# Patient Record
Sex: FEMALE | Race: WHITE | Employment: FULL TIME | URBAN - METROPOLITAN AREA
[De-identification: names, ages, dates, MRNs, and addresses within clinical notes are randomized per-mention and may not be internally consistent; named-entity substitution may affect disease eponyms.]

---

## 2019-01-03 ENCOUNTER — OFFICE VISIT (OUTPATIENT)
Dept: ENDOCRINOLOGY | Age: 49
End: 2019-01-03
Payer: COMMERCIAL

## 2019-01-03 VITALS
BODY MASS INDEX: 42.99 KG/M2 | SYSTOLIC BLOOD PRESSURE: 130 MMHG | DIASTOLIC BLOOD PRESSURE: 76 MMHG | HEIGHT: 62 IN | WEIGHT: 233.6 LBS | HEART RATE: 70 BPM | OXYGEN SATURATION: 99 % | RESPIRATION RATE: 16 BRPM

## 2019-01-03 DIAGNOSIS — E78.5 HYPERLIPIDEMIA, UNSPECIFIED HYPERLIPIDEMIA TYPE: ICD-10-CM

## 2019-01-03 DIAGNOSIS — E55.9 VITAMIN D DEFICIENCY: ICD-10-CM

## 2019-01-03 DIAGNOSIS — E10.9 TYPE 1 DIABETES MELLITUS WITHOUT COMPLICATION (HCC): Primary | ICD-10-CM

## 2019-01-03 PROCEDURE — G8427 DOCREV CUR MEDS BY ELIG CLIN: HCPCS | Performed by: INTERNAL MEDICINE

## 2019-01-03 PROCEDURE — 99213 OFFICE O/P EST LOW 20 MIN: CPT | Performed by: INTERNAL MEDICINE

## 2019-01-03 PROCEDURE — 1036F TOBACCO NON-USER: CPT | Performed by: INTERNAL MEDICINE

## 2019-01-03 PROCEDURE — 3046F HEMOGLOBIN A1C LEVEL >9.0%: CPT | Performed by: INTERNAL MEDICINE

## 2019-01-03 PROCEDURE — G8417 CALC BMI ABV UP PARAM F/U: HCPCS | Performed by: INTERNAL MEDICINE

## 2019-01-03 PROCEDURE — 2022F DILAT RTA XM EVC RTNOPTHY: CPT | Performed by: INTERNAL MEDICINE

## 2019-01-03 PROCEDURE — G8484 FLU IMMUNIZE NO ADMIN: HCPCS | Performed by: INTERNAL MEDICINE

## 2019-01-03 RX ORDER — SIMVASTATIN 20 MG
20 TABLET ORAL NIGHTLY
Qty: 90 TABLET | Refills: 1
Start: 2019-01-03

## 2019-01-03 RX ORDER — LISINOPRIL 5 MG/1
5 TABLET ORAL DAILY
COMMUNITY

## 2019-01-03 RX ORDER — MECLIZINE HYDROCHLORIDE 25 MG/1
25 TABLET ORAL PRN
COMMUNITY
Start: 2017-12-13

## 2019-01-03 RX ORDER — NAPROXEN 500 MG/1
500 TABLET ORAL PRN
COMMUNITY
Start: 2016-10-17

## 2019-04-03 ENCOUNTER — OFFICE VISIT (OUTPATIENT)
Dept: ENDOCRINOLOGY | Age: 49
End: 2019-04-03
Payer: COMMERCIAL

## 2019-04-03 VITALS
HEIGHT: 62 IN | SYSTOLIC BLOOD PRESSURE: 138 MMHG | OXYGEN SATURATION: 99 % | HEART RATE: 79 BPM | DIASTOLIC BLOOD PRESSURE: 80 MMHG | WEIGHT: 227.2 LBS | BODY MASS INDEX: 41.81 KG/M2

## 2019-04-03 DIAGNOSIS — E55.9 VITAMIN D DEFICIENCY: ICD-10-CM

## 2019-04-03 DIAGNOSIS — E10.9 TYPE 1 DIABETES MELLITUS WITHOUT COMPLICATION (HCC): Primary | ICD-10-CM

## 2019-04-03 LAB — HBA1C MFR BLD: 6.5 %

## 2019-04-03 PROCEDURE — 1036F TOBACCO NON-USER: CPT | Performed by: INTERNAL MEDICINE

## 2019-04-03 PROCEDURE — 3044F HG A1C LEVEL LT 7.0%: CPT | Performed by: INTERNAL MEDICINE

## 2019-04-03 PROCEDURE — 3017F COLORECTAL CA SCREEN DOC REV: CPT | Performed by: INTERNAL MEDICINE

## 2019-04-03 PROCEDURE — 83036 HEMOGLOBIN GLYCOSYLATED A1C: CPT | Performed by: INTERNAL MEDICINE

## 2019-04-03 PROCEDURE — G8427 DOCREV CUR MEDS BY ELIG CLIN: HCPCS | Performed by: INTERNAL MEDICINE

## 2019-04-03 PROCEDURE — G8417 CALC BMI ABV UP PARAM F/U: HCPCS | Performed by: INTERNAL MEDICINE

## 2019-04-03 PROCEDURE — 99214 OFFICE O/P EST MOD 30 MIN: CPT | Performed by: INTERNAL MEDICINE

## 2019-04-03 PROCEDURE — 2022F DILAT RTA XM EVC RTNOPTHY: CPT | Performed by: INTERNAL MEDICINE

## 2019-04-03 NOTE — PROGRESS NOTES
ENDOCRINOLOGY CLINIC NOTE    Date of Service: 04/3/2019     Medical Records Reviewed:   I personally reviewed and summarized previous records     Primary Care Physician: Deandre Swanson DO  Provider: Rose Wells MD  Other provider(s):            Reason for the visit:  Type I DM on insulin Pump, vitD deficiency, Hyperlipidemia     History of Present Illness: The history is provided by the patient. No  was used. Accuracy of the patient data is excellent. Alessandro Chacon is a very pleasant 47 y.o. female seen in Endocrine clinic today for diabetes management     The history is provided by the patient. No  was used. Accuracy of the patient data is excellent. Alessandro Chacon is a very pleasant 51year old female seen in Endocrine clinic today for diabetes management     Alessandro Chacon was diagnosed with diabetes in 2001   Pump: medtronic pump 670 , has been on and off automode and sensors. Pt troubled with skin rash from CGM adhesive material   Current settings:   Basal Insulin:12a 0.850, 5a 0.850, 12p 0.875, 5p 0.9, 10p 0.850 Carbohydrate ratio 12, , Sensitivity: 12a 55, 7a 55, 9p 55, Target: 115 -120 Active Insulin: 4 hours  Continues to have a problem with tape from sensors, has tried a number of suggestions without benefit  Insulin: Novolog  Range of BSs: 61 to 200  Lab Results   Component Value Date    LABA1C 6.5 04/03/2019     No recent significant hypoglycemic episodes   Activity level: Busy with Family and work  Lifestyle modification: Diet    Patient is up to date with eye exam and denied any h/o diabetic retinopathy   Podiatry Exam:self care     Microvascular complications: no nephropathy, retinopathy, neuropathy  Macrovascular complications: no CAD, PVD, no CVA    2.  Hyperlipidemia - on statin     PAST MEDICAL HISTORY   Past Medical History:   Diagnosis Date    Depression     DM type 1 [facility]diabetes mellitus[facility] type 1) ([facility])     On insulin Pump     Hyperlipidemia     Insulin pump in place     Vitamin D deficiency      PAST SURGICAL HISTORY   No past surgical history on file. SOCIAL HISTORY   Social History     Socioeconomic History    Marital status:      Spouse name: Not on file    Number of children: Not on file    Years of education: Not on file    Highest education level: Not on file   Occupational History    Not on file   Social Needs    Financial resource strain: Not on file    Food insecurity:     Worry: Not on file     Inability: Not on file    Transportation needs:     Medical: Not on file     Non-medical: Not on file   Tobacco Use    Smoking status: Never Smoker    Smokeless tobacco: Never Used   Substance and Sexual Activity    Alcohol use: Yes     Comment: socially    Drug use: No    Sexual activity: Not on file   Lifestyle    Physical activity:     Days per week: Not on file     Minutes per session: Not on file    Stress: Not on file   Relationships    Social connections:     Talks on phone: Not on file     Gets together: Not on file     Attends [employer] service: Not on file     Active member of club or organization: Not on file     Attends meetings of clubs or organizations: Not on file     Relationship status: Not on file    Intimate partner violence:     Fear of current or ex partner: Not on file     Emotionally abused: Not on file     Physically abused: Not on file     Forced sexual activity: Not on file   Other Topics Concern    Not on file   Social History Narrative    Not on file     FAMILY HISTORY   Family History   Problem Relation Age of Onset    COPD Father     Heart Disease Father     Diabetes Paternal Grandmother     Heart Disease Paternal Grandfather      ALLERGIES AND DRUG REACTIONS   Allergies   Allergen Reactions    Tramadol      Other reaction(s):  Other See Comments  Syncope       CURRENT MEDICATIONS     Current Outpatient Medications   Medication Sig Dispense Refill    lisinopril (PRINIVIL;ZESTRIL) 5 MG tablet Take 5 mg by mouth daily      meclizine (ANTIVERT) 25 MG tablet Take 25 mg by mouth as needed       naproxen (NAPROSYN) 500 MG tablet Take 500 mg by mouth as needed       sertraline (ZOLOFT) 50 MG tablet Take 50 mg by mouth daily      insulin lispro (HUMALOG) 100 UNIT/ML injection vial Via insulin Pump.  units daily 3 vial 5    simvastatin (ZOCOR) 20 MG tablet Take 1 tablet by mouth nightly 90 tablet 1     No current facility-administered medications for this visit. Review of Systems  Constitutional: No fever, no chills, no diaphoresis, no generalized weakness. HEENT: No blurred vision, No sore throat, no ear pain, no hair loss  Neck: denied any neck swelling, difficulty swallowing,   Cardio-pulmonary: No CP, SOB or palpitation, No orthopnea or PND. No cough or wheezing. GI: No N/V/D, no constipation, No abdominal pain, no melena or hematochezia   : Denied any dysuria, hematuria, flank pain, discharge, or incontinence. Skin: denied any rash, ulcer, Hirsute, or hyperpigmentation. MSK: denied any joint deformity, joint pain/swelling, muscle pain, or back pain. Neuro: no numbness, no tingling, no weakness, _  OBJECTIVE    /80 (Site: Right Upper Arm, Position: Sitting, Cuff Size: Medium Adult)   Pulse 79   Ht 5' 2\" (1.575 m)   Wt 227 lb 3.2 oz (103.1 kg)   SpO2 99%   BMI 41.56 kg/m²   BP Readings from Last 4 Encounters:   04/03/19 138/80   01/03/19 130/76     Wt Readings from Last 6 Encounters:   04/03/19 227 lb 3.2 oz (103.1 kg)   01/03/19 233 lb 9.6 oz (106 kg)       Physical examination:  General: awake alert, oriented x3, no abnormal position or movements. HEENT: normocephalic non-traumatic, no exophthalmos   Neck: supple, no LN enlargement, no thyromegaly, no thyroid tenderness, no JVD. Pulm: Clear equal air entry no added sounds, no wheezing or rhonchi    CVS: S1 + S2, no murmur, no heave.  Dorsalis pedis pulse palpable   Abd: soft lax, no tenderness, no organomegaly, audible bowel sounds. Skin: warm, no lesions, no rash. No callus, no Ulcers, No acanthosis nigricans. Insulin pump insertion site looks normal    Neuro: CN intact, Monofilament sensation arian bilateral , muscle power normal  Psych: normal mood, and affect      Review of Laboratory Data:  I have reviewed the followin [address] [facility])  Component Name Value Ref Range   Sodium(Na) 141     Potassium(K) 3.7     Glucose 105     Urea Nitrogen 5     Creatinine 0.72     Calcium(Ca) 9.2     EGFR NON  >60      HEMOGLOBIN A1C2018  [address])  Component Name Value Ref Range   HEMOGLOBIN A1C 6.2 (A)      VITAMIN D 25-OH SCREEN FOR DEFICIENCY/[id number]/9/2018  [facility])  Component Name Value Ref Range   Vitamin D, 25-OH,D2 32.2      URINE ALBUMIN/[id number]/9/2018  [facility])  Component Name Value Ref Range   Urine Albumin 4.0     Albumin-Creat.  Ratio 7.2     Urine Creatinine 55.3          No results found for: WBC, RBC, HGB, HCT, MCV, MCH, MCHC, RDW, PLT, MPV, GRANULOCYTES, BANDS   No results found for: NA, K, CO2, BUN, CALCIUM, GFR   No results found for: TSH, T4FREE, W4ZVKGX, FT3, B7KLVNF, TSI, TPOABS, THGAB  Lab Results   Component Value Date    LABA1C 6.5 2019     No results found for: CHOL, TRIG, HDL, LDLDIRECT  No results found for: VITD25     Medical Records/Labs/Images review:   I personally reviewed and summarized previous records   All labs were reviewed independently     Almita Collier, a 47 y.o.-old female seen in for the following issues     Diabetes Mellitus Type 1   · Continue current pump settings: Basal Insulin:12a 0.850, 5a 0.850, 12p 0.875, 5p 0.9, 10p 0.850 Carbohydrate ratio 12, , Sensitivity: 12a 55, 7a 55, 9p 55, Target: 115 -120 Active Insulin: 4 hours  · Optimal blood sugars: 100-140 pre-prandial, < 180 post-prandial. BG above 200 increases risk of dehydration, glycosuria, and DKA. · Patient up to date with the routine diabetes maintenance and prevention   · Labs before next OV     Hyperlipidemia  · At goal, continue statin    Low vitamin D  · Continue taking vitD 2000 IU daily    Return in about 3 months (around 7/3/2019) for DM type on insulin Pump . The above issues were reviewed with the patient who understood and agreed with the plan. Thank you for allowing us to participate in the care of this patient. Please do not hesitate to contact us with any additional questions. Diagnosis Orders   1. Type 1 diabetes mellitus without complication (HCC)  POCT glycosylated hemoglobin (Hb A1C)    Basic Metabolic Panel    Hemoglobin A1C    Microalbumin / Creatinine Urine Ratio   2.  Vitamin D deficiency  Vitamin D 25 Hydrox, D2 & D3       Miguel Cam MD  Endocrinologist, [facility])   [address] [place] [id number]   Phone: [phone]  Fax: [phone]  --------------------------------------------  Electronically signed

## 2019-04-03 NOTE — LETTER
Basal Insulin:12a 0.850, 5a 0.850, 12p 0.875, 5p 0.9, 10p 0.850 Carbohydrate ratio 12, , Sensitivity: 12a 55, 7a 55, 9p 55, Target: 115 -120 Active Insulin: 4 hours  Continues to have a problem with tape from sensors, has tried a number of suggestions without benefit  Insulin: Novolog  Range of BSs: 61 to 200  Lab Results   Component Value Date    LABA1C 6.5 04/03/2019     No recent significant hypoglycemic episodes   Activity level: Busy with Family and work  Lifestyle modification: Diet    Patient is up to date with eye exam and denied any h/o diabetic retinopathy   Podiatry Exam:self care     Microvascular complications: no nephropathy, retinopathy, neuropathy  Macrovascular complications: no CAD, PVD, no CVA    2. Hyperlipidemia - on statin     PAST MEDICAL HISTORY   Past Medical History:   Diagnosis Date    Depression     DM type 1 (diabetes mellitus, type 1) ([facility])     On insulin Pump     Hyperlipidemia     Insulin pump in place     Vitamin D deficiency      PAST SURGICAL HISTORY   No past surgical history on file.   SOCIAL HISTORY   Social History     Socioeconomic History    Marital status:      Spouse name: Not on file    Number of children: Not on file    Years of education: Not on file    Highest education level: Not on file   Occupational History    Not on file   Social Needs    Financial resource strain: Not on file    Food insecurity:     Worry: Not on file     Inability: Not on file    Transportation needs:     Medical: Not on file     Non-medical: Not on file   Tobacco Use    Smoking status: Never Smoker    Smokeless tobacco: Never Used   Substance and Sexual Activity    Alcohol use: Yes     Comment: socially    Drug use: No    Sexual activity: Not on file   Lifestyle    Physical activity:     Days per week: Not on file     Minutes per session: Not on file    Stress: Not on file   Relationships    Social connections:     Talks on phone: Not on file Gets together: Not on file     Attends [employer] service: Not on file     Active member of club or organization: Not on file     Attends meetings of clubs or organizations: Not on file     Relationship status: Not on file    Intimate partner violence:     Fear of current or ex partner: Not on file     Emotionally abused: Not on file     Physically abused: Not on file     Forced sexual activity: Not on file   Other Topics Concern    Not on file   Social History Narrative    Not on file     FAMILY HISTORY   Family History   Problem Relation Age of Onset    COPD Father     Heart Disease Father     Diabetes Paternal Grandmother     Heart Disease Paternal Grandfather      ALLERGIES AND DRUG REACTIONS   Allergies   Allergen Reactions    Tramadol      Other reaction(s): Other See Comments  Syncope       CURRENT MEDICATIONS     Current Outpatient Medications   Medication Sig Dispense Refill    lisinopril (PRINIVIL;ZESTRIL) 5 MG tablet Take 5 mg by mouth daily      meclizine (ANTIVERT) 25 MG tablet Take 25 mg by mouth as needed       naproxen (NAPROSYN) 500 MG tablet Take 500 mg by mouth as needed       sertraline (ZOLOFT) 50 MG tablet Take 50 mg by mouth daily      insulin lispro (HUMALOG) 100 UNIT/ML injection vial Via insulin Pump.  units daily 3 vial 5    simvastatin (ZOCOR) 20 MG tablet Take 1 tablet by mouth nightly 90 tablet 1     No current facility-administered medications for this visit. Review of Systems  Constitutional: No fever, no chills, no diaphoresis, no generalized weakness. HEENT: No blurred vision, No sore throat, no ear pain, no hair loss  Neck: denied any neck swelling, difficulty swallowing,   Cardio-pulmonary: No CP, SOB or palpitation, No orthopnea or PND. No cough or wheezing. GI: No N/V/D, no constipation, No abdominal pain, no melena or hematochezia   : Denied any dysuria, hematuria, flank pain, discharge, or incontinence. [facility])  Component Name Value Ref Range   Urine Albumin 4.0     Albumin-Creat. Ratio 7.2     Urine Creatinine 55.3          No results found for: WBC, RBC, HGB, HCT, MCV, MCH, MCHC, RDW, PLT, MPV, GRANULOCYTES, BANDS   No results found for: NA, K, CO2, BUN, CALCIUM, GFR   No results found for: TSH, T4FREE, T8DLUTX, FT3, Z1APVAN, TSI, TPOABS, THGAB  Lab Results   Component Value Date    LABA1C 6.5 04/03/2019     No results found for: CHOL, TRIG, HDL, LDLDIRECT  No results found for: VITD25     Medical Records/Labs/Images review:   I personally reviewed and summarized previous records   All labs were reviewed independently     Almita Collier, a 47 y.o.-old female seen in for the following issues     Diabetes Mellitus Type 1   · Continue current pump settings: Basal Insulin:12a 0.850, 5a 0.850, 12p 0.875, 5p 0.9, 10p 0.850 Carbohydrate ratio 12, , Sensitivity: 12a 55, 7a 55, 9p 55, Target: 115 -120 Active Insulin: 4 hours  · Optimal blood sugars: 100-140 pre-prandial, < 180 post-prandial. BG above 200 increases risk of dehydration, glycosuria, and DKA. · Patient up to date with the routine diabetes maintenance and prevention   · Labs before next OV     Hyperlipidemia  · At goal, continue statin    Low vitamin D  · Continue taking vitD 2000 IU daily    Return in about 3 months (around 7/3/2019) for DM type on insulin Pump . The above issues were reviewed with the patient who understood and agreed with the plan. Thank you for allowing us to participate in the care of this patient. Please do not hesitate to contact us with any additional questions. Diagnosis Orders   1. Type 1 diabetes mellitus without complication (HCC)  POCT glycosylated hemoglobin (Hb A1C)    Basic Metabolic Panel    Hemoglobin A1C    Microalbumin / Creatinine Urine Ratio   2.  Vitamin D deficiency  Vitamin D 25 Hydrox, D2 & D3       Noe Michaels MD Endocrinologist, [facility])   [address] [facility] [address]   Phone: [phone]  Fax: [phone]  --------------------------------------------  Electronically signed

## 2019-07-10 ENCOUNTER — OFFICE VISIT (OUTPATIENT)
Dept: ENDOCRINOLOGY | Age: 49
End: 2019-07-10
Payer: COMMERCIAL

## 2019-07-10 VITALS
HEIGHT: 62 IN | HEART RATE: 62 BPM | WEIGHT: 223.8 LBS | BODY MASS INDEX: 41.18 KG/M2 | OXYGEN SATURATION: 98 % | SYSTOLIC BLOOD PRESSURE: 128 MMHG | DIASTOLIC BLOOD PRESSURE: 82 MMHG | RESPIRATION RATE: 16 BRPM

## 2019-07-10 DIAGNOSIS — E55.9 VITAMIN D DEFICIENCY: ICD-10-CM

## 2019-07-10 DIAGNOSIS — E78.5 HYPERLIPIDEMIA, UNSPECIFIED HYPERLIPIDEMIA TYPE: ICD-10-CM

## 2019-07-10 DIAGNOSIS — E10.9 TYPE 1 DIABETES MELLITUS WITHOUT COMPLICATION (HCC): Primary | ICD-10-CM

## 2019-07-10 PROCEDURE — G8417 CALC BMI ABV UP PARAM F/U: HCPCS | Performed by: INTERNAL MEDICINE

## 2019-07-10 PROCEDURE — G8427 DOCREV CUR MEDS BY ELIG CLIN: HCPCS | Performed by: INTERNAL MEDICINE

## 2019-07-10 PROCEDURE — 1036F TOBACCO NON-USER: CPT | Performed by: INTERNAL MEDICINE

## 2019-07-10 PROCEDURE — 99214 OFFICE O/P EST MOD 30 MIN: CPT | Performed by: INTERNAL MEDICINE

## 2019-07-10 PROCEDURE — 3044F HG A1C LEVEL LT 7.0%: CPT | Performed by: INTERNAL MEDICINE

## 2019-07-10 PROCEDURE — 3017F COLORECTAL CA SCREEN DOC REV: CPT | Performed by: INTERNAL MEDICINE

## 2019-07-10 PROCEDURE — 2022F DILAT RTA XM EVC RTNOPTHY: CPT | Performed by: INTERNAL MEDICINE

## 2019-07-10 RX ORDER — FLASH GLUCOSE SCANNING READER
EACH MISCELLANEOUS
Qty: 1 DEVICE | Refills: 0 | Status: SHIPPED | OUTPATIENT
Start: 2019-07-10 | End: 2019-07-10

## 2019-07-10 RX ORDER — FLASH GLUCOSE SCANNING READER
EACH MISCELLANEOUS
Qty: 1 DEVICE | Refills: 0 | Status: SHIPPED | OUTPATIENT
Start: 2019-07-10 | End: 2020-05-15

## 2019-07-10 RX ORDER — FLASH GLUCOSE SENSOR
KIT MISCELLANEOUS
Qty: 2 EACH | Refills: 5 | Status: SHIPPED | OUTPATIENT
Start: 2019-07-10 | End: 2020-05-15

## 2019-07-10 RX ORDER — FLASH GLUCOSE SENSOR
KIT MISCELLANEOUS
Qty: 2 EACH | Refills: 5 | Status: SHIPPED | OUTPATIENT
Start: 2019-07-10 | End: 2019-07-10

## 2019-07-10 NOTE — PROGRESS NOTES
ENDOCRINOLOGY CLINIC NOTE    Date of Service: 04/3/2019     Primary Care Physician: Osmar Roy DO  Provider: Jabier Reyes MD  Other provider(s):            Reason for the visit:  Type I DM on insulin Pump, vitD deficiency, Hyperlipidemia     History of Present Illness: The history is provided by the patient. No  was used. Accuracy of the patient data is excellent. Suze Vieyra is a very pleasant 47 y.o. female seen in Endocrine clinic today for diabetes management     Suze Vieyra was diagnosed with diabetes in 2001 and has been on insulin Pump for many years   Currently on medtronic pump 670 , has been on and off automode and sensors. Pt troubled with skin rash from CGM adhesive material   Current settings:  Basal Insulin:12a 0.820, 5a 0.850, 12p 0.875, 5p 0.9, 10p 0.850, CR 12, ISF 12a 55, 7a 55, 9p 55, Target: 100 -120 Active Insulin: 4 hours  Continues to have skin reaction with tape from sensors, has tried a number of suggestions without benefit  6/2019 - A1c 6.1%   The patient still experiencing frequent hypoglycemic episodes both fasting and postprandial (see pump download)   Activity level: Busy with Family and work  Lifestyle modification: Diet  Patient is up to date with eye exam and denied any h/o diabetic retinopathy   Podiatry Exam:self care   Microvascular complications: no nephropathy, retinopathy, or neuropathy  Macrovascular complications: no CAD, PVD, or CVA    2. Hyperlipidemia - on statin. PAST MEDICAL HISTORY   Past Medical History:   Diagnosis Date    Depression     DM type 1 (diabetes mellitus, type 1) (HCC)     On insulin Pump     Hyperlipidemia     Insulin pump in place     Vitamin D deficiency      PAST SURGICAL HISTORY   No past surgical history on file.   SOCIAL HISTORY   Social History     Socioeconomic History    Marital status:      Spouse name: Not on file    Number of children: Not on file    Years of education: Not on file    Highest -120 Active Insulin: 4 hours  · Unfortunately she wasn't able to use CGM with her pump due to skin allergy to adhesive   · Will order freestyle Niya sensor and scanner   · Optimal blood sugars: 100-140 pre-prandial, < 180 post-prandial. BG above 200 increases risk of dehydration, glycosuria, and DKA. · Patient up to date with the routine diabetes maintenance and prevention     Hyperlipidemia  · At goal, continue statin    Low vitamin D  · Continue vitD supplement     Return in about 3 months (around 10/10/2019) for DM type 1  . The above issues were reviewed with the patient who understood and agreed with the plan. Thank you for allowing us to participate in the care of this patient. Please do not hesitate to contact us with any additional questions. Diagnosis Orders   1.  Type 1 diabetes mellitus without complication (HCC)  Continuous Blood Gluc Sensor (FREESTYLE NIYA 14 DAY SENSOR) MISC    Continuous Blood Gluc  (FREESTYLE NIYA 14 DAY READER) TIKI    DISCONTINUED: Continuous Blood Gluc Sensor (FREESTYLE NIYA 14 DAY SENSOR) MISC    DISCONTINUED: Continuous Blood Gluc  (FREESTYLE NIYA 14 DAY READER) Lay Bates MD  Endocrinologist, [facility])   [address] [facility], [address] [place] [address],[facility] [address]   Phone: [phone]  Fax: [phone]  --------------------------------------------  Electronically signed

## 2019-10-10 DIAGNOSIS — E10.9 TYPE 1 DIABETES MELLITUS WITHOUT COMPLICATION (HCC): ICD-10-CM

## 2019-10-10 DIAGNOSIS — E10.9 TYPE 1 DIABETES MELLITUS WITHOUT COMPLICATION (HCC): Primary | ICD-10-CM

## 2019-10-16 ENCOUNTER — OFFICE VISIT (OUTPATIENT)
Dept: ENDOCRINOLOGY | Age: 49
End: 2019-10-16
Payer: COMMERCIAL

## 2019-10-16 VITALS
WEIGHT: 227.6 LBS | SYSTOLIC BLOOD PRESSURE: 132 MMHG | HEIGHT: 62 IN | OXYGEN SATURATION: 99 % | RESPIRATION RATE: 16 BRPM | BODY MASS INDEX: 41.88 KG/M2 | DIASTOLIC BLOOD PRESSURE: 78 MMHG | HEART RATE: 86 BPM

## 2019-10-16 DIAGNOSIS — E78.5 HYPERLIPIDEMIA, UNSPECIFIED HYPERLIPIDEMIA TYPE: ICD-10-CM

## 2019-10-16 DIAGNOSIS — E16.2 HYPOGLYCEMIA: ICD-10-CM

## 2019-10-16 DIAGNOSIS — E10.9 TYPE 1 DIABETES MELLITUS WITHOUT COMPLICATION (HCC): Primary | ICD-10-CM

## 2019-10-16 DIAGNOSIS — E55.9 VITAMIN D DEFICIENCY: ICD-10-CM

## 2019-10-16 LAB — HBA1C MFR BLD: 6.8 %

## 2019-10-16 PROCEDURE — 2022F DILAT RTA XM EVC RTNOPTHY: CPT | Performed by: INTERNAL MEDICINE

## 2019-10-16 PROCEDURE — 3044F HG A1C LEVEL LT 7.0%: CPT | Performed by: INTERNAL MEDICINE

## 2019-10-16 PROCEDURE — G8484 FLU IMMUNIZE NO ADMIN: HCPCS | Performed by: INTERNAL MEDICINE

## 2019-10-16 PROCEDURE — 1036F TOBACCO NON-USER: CPT | Performed by: INTERNAL MEDICINE

## 2019-10-16 PROCEDURE — 3017F COLORECTAL CA SCREEN DOC REV: CPT | Performed by: INTERNAL MEDICINE

## 2019-10-16 PROCEDURE — G8427 DOCREV CUR MEDS BY ELIG CLIN: HCPCS | Performed by: INTERNAL MEDICINE

## 2019-10-16 PROCEDURE — G8417 CALC BMI ABV UP PARAM F/U: HCPCS | Performed by: INTERNAL MEDICINE

## 2019-10-16 PROCEDURE — 83036 HEMOGLOBIN GLYCOSYLATED A1C: CPT | Performed by: INTERNAL MEDICINE

## 2019-10-16 PROCEDURE — 99214 OFFICE O/P EST MOD 30 MIN: CPT | Performed by: INTERNAL MEDICINE

## 2020-01-10 ENCOUNTER — TELEPHONE (OUTPATIENT)
Dept: ADMINISTRATIVE | Age: 50
End: 2020-01-10

## 2020-03-11 RX ORDER — PERPHENAZINE 16 MG/1
TABLET, FILM COATED ORAL
Qty: 200 EACH | Refills: 5 | Status: SHIPPED | OUTPATIENT
Start: 2020-03-11

## 2020-05-04 LAB
AVERAGE GLUCOSE: NORMAL
BUN BLDV-MCNC: NORMAL MG/DL
CALCIUM SERPL-MCNC: 8.8 MG/DL
CHLORIDE BLD-SCNC: NORMAL MMOL/L
CO2: NORMAL
CREAT SERPL-MCNC: 0.69 MG/DL
GFR CALCULATED: 101
GLUCOSE BLD-MCNC: NORMAL MG/DL
HBA1C MFR BLD: 6.9 %
POTASSIUM SERPL-SCNC: 4.2 MMOL/L
SODIUM BLD-SCNC: 138 MMOL/L

## 2020-05-06 RX ORDER — BLOOD SUGAR DIAGNOSTIC
STRIP MISCELLANEOUS
Qty: 200 EACH | Refills: 5 | Status: SHIPPED | OUTPATIENT
Start: 2020-05-06

## 2020-05-06 RX ORDER — BLOOD-GLUCOSE METER
EACH MISCELLANEOUS
Qty: 1 KIT | Refills: 1 | Status: SHIPPED | OUTPATIENT
Start: 2020-05-06

## 2020-05-15 ENCOUNTER — VIRTUAL VISIT (OUTPATIENT)
Dept: ENDOCRINOLOGY | Age: 50
End: 2020-05-15
Payer: COMMERCIAL

## 2020-05-15 PROCEDURE — 99214 OFFICE O/P EST MOD 30 MIN: CPT | Performed by: INTERNAL MEDICINE

## 2020-05-15 NOTE — PROGRESS NOTES
Darleen Ordonez was read the following message We want to confirm that, for purposes of billing, this is a virtual visit with your provider for which we will submit a claim for reimbursement with your insurance company. You will be responsible for any copays, coinsurance amounts or other amounts not covered by your insurance company. If you do not accept this, unfortunately we will not be able to schedule a virtual visit with the provider. Do you accept?  Kolton Ramos responded YES
: Denied any dysuria, hematuria, flank pain, discharge, or incontinence. Skin: denied any rash, ulcer, Hirsute, or hyperpigmentation. MSK: denied any joint deformity, joint pain/swelling, muscle pain, or back pain. Neuro: no numbness, no tingling, no weakness,  OBJECTIVE    There were no vitals taken for this visit. BP Readings from Last 4 Encounters:   10/16/19 132/78   07/10/19 128/82   04/03/19 138/80   01/03/19 130/76     Wt Readings from Last 6 Encounters:   10/16/19 227 lb 9.6 oz (103.2 kg)   07/10/19 223 lb 12.8 oz (101.5 kg)   04/03/19 227 lb 3.2 oz (103.1 kg)   01/03/19 233 lb 9.6 oz (106 kg)     Physical examination:  Due to this being a TeleHealth encounter, evaluation of the following organ systems is limited: Vitals/Constitutional/EENT/Resp/CV/GI//MS/Neuro/Skin/Heme-Lymph-Imm. Modified physical exam through Telemedicine camera    General: Communicating well via camera   Neck: no obvious neck mass. No obvious neck deformity     CVS: no distress   Chest: no distress. Chest is moving with respiration    Extremities:  no visible tremor  Skin: No visible rashes as seen from camera   Musculoskeletal: no visible deformity  Neuro: Alert and oriented to person, place, and time. Psychiatric: Normal mood and affect.  Behavior is normal    Review of Laboratory Data:  I have reviewed the following:VITAMIN D 25-OH 6/8/2019  Component Name Value   Vitamin D, 25-OH,D2 45.2     No results found for: WBC, RBC, HGB, HCT, MCV, MCH, MCHC, RDW, PLT, MPV, GRANULOCYTES, BANDS   Lab Results   Component Value Date/Time     05/04/2020    K 4.2 05/04/2020    CALCIUM 8.8 05/04/2020      No results found for: TSH, T4FREE, E5VKJIV, FT3, T1GOPHJ, TSI, TPOABS, THGAB  Lab Results   Component Value Date    LABA1C 6.9 05/04/2020     No results found for: CHOL, TRIG, HDL, LDLDIRECT  No results found for: VITD25     Medical Records/Labs/Images review:   I personally reviewed and summarized previous records   All labs

## 2020-08-27 ENCOUNTER — TELEPHONE (OUTPATIENT)
Dept: ENDOCRINOLOGY | Age: 50
End: 2020-08-27

## 2020-08-27 NOTE — TELEPHONE ENCOUNTER
Pt called in and requested a new start to try the dexcom cgm, educated pt will pend rx for dexcom transmitter and sensor for pt to try if  approves.

## 2020-08-28 RX ORDER — BLOOD-GLUCOSE SENSOR
EACH MISCELLANEOUS
Qty: 9 EACH | Refills: 3 | Status: SHIPPED | OUTPATIENT
Start: 2020-08-28 | End: 2020-09-25 | Stop reason: SDUPTHER

## 2020-08-28 RX ORDER — BLOOD-GLUCOSE TRANSMITTER
EACH MISCELLANEOUS
Qty: 1 EACH | Refills: 0 | Status: SHIPPED | OUTPATIENT
Start: 2020-08-28 | End: 2020-09-25 | Stop reason: SDUPTHER

## 2020-09-01 RX ORDER — BLOOD-GLUCOSE SENSOR
EACH MISCELLANEOUS
Qty: 3 EACH | Refills: 11 | Status: SHIPPED | OUTPATIENT
Start: 2020-09-01

## 2020-09-01 RX ORDER — BLOOD-GLUCOSE,RECEIVER,CONT
EACH MISCELLANEOUS
Qty: 1 DEVICE | Refills: 0 | Status: SHIPPED
Start: 2020-09-01 | End: 2020-09-25 | Stop reason: SDUPTHER

## 2020-09-01 RX ORDER — BLOOD-GLUCOSE TRANSMITTER
EACH MISCELLANEOUS
Qty: 1 EACH | Refills: 3 | Status: SHIPPED | OUTPATIENT
Start: 2020-09-01

## 2020-09-25 LAB — DIABETIC RETINOPATHY: NORMAL

## 2020-09-25 RX ORDER — BLOOD-GLUCOSE TRANSMITTER
EACH MISCELLANEOUS
Qty: 1 EACH | Refills: 0 | Status: SHIPPED | OUTPATIENT
Start: 2020-09-25

## 2020-09-25 RX ORDER — BLOOD-GLUCOSE,RECEIVER,CONT
EACH MISCELLANEOUS
Qty: 1 DEVICE | Refills: 0 | Status: SHIPPED | OUTPATIENT
Start: 2020-09-25

## 2020-09-25 RX ORDER — BLOOD-GLUCOSE SENSOR
EACH MISCELLANEOUS
Qty: 9 EACH | Refills: 3 | Status: SHIPPED | OUTPATIENT
Start: 2020-09-25

## 2020-10-22 ENCOUNTER — TELEPHONE (OUTPATIENT)
Dept: ENDOCRINOLOGY | Age: 50
End: 2020-10-22

## 2020-11-10 ENCOUNTER — TELEPHONE (OUTPATIENT)
Dept: ENDOCRINOLOGY | Age: 50
End: 2020-11-10

## 2020-11-10 NOTE — TELEPHONE ENCOUNTER
Pt called and is having surgery on 11/21/2020 Pt wanted to know what to do about her blood sugar. She stated that it uses drops during surgery. The place where she is having it done at wanted some kind of clearance from you.

## 2020-11-16 ENCOUNTER — OFFICE VISIT (OUTPATIENT)
Dept: ENDOCRINOLOGY | Age: 50
End: 2020-11-16
Payer: COMMERCIAL

## 2020-11-16 VITALS
WEIGHT: 235.8 LBS | BODY MASS INDEX: 43.13 KG/M2 | HEART RATE: 71 BPM | DIASTOLIC BLOOD PRESSURE: 78 MMHG | TEMPERATURE: 98.3 F | SYSTOLIC BLOOD PRESSURE: 128 MMHG

## 2020-11-16 PROCEDURE — 99214 OFFICE O/P EST MOD 30 MIN: CPT | Performed by: INTERNAL MEDICINE

## 2020-11-16 NOTE — PROGRESS NOTES
[address] [place] [address] Department of Endocrinology Diabetes and Metabolism   [address] [facility] [address]   Phone: [phone]  Fax: [phone]    Date of Service: 11/16/20    Primary Care Physician: Nikki Jeffries DO  Provider: Gilmer Browning MD     Reason for the visit:  Type I DM on insulin Pump, vitD deficiency, Hyperlipidemia     History of Present Illness: The history is provided by the patient. No  was used. Accuracy of the patient data is excellent. Goldie Georges is a very pleasant 45 y.o. female seen today for follow up visit     Goldie Georges was diagnosed with diabetes in 2001 and has been on insulin Pump for many years   Currently on medtronic pump 670 , has been on and off automode and sensors. Pt troubled with skin rash from CGM adhesive material   Current settings:  Basal Insulin:12a 0.750, 5a 0.725, 12p 0.825, 5p 0.850, 10p 0.80, CR 13 , ISF 12a 85 , 7a 85 , 9p 85 , Target: 115 -120 Active Insulin: 3;30 hours  Continues to have skin reaction with tape from sensors, has tried a number of suggestions without benefit  Lab Results   Component Value Date    LABA1C 6.9 05/04/2020    LABA1C 6.8 10/16/2019    LABA1C 6.5 04/03/2019     The patient still experiencing frequent hypoglycemic episodes both fasting and postprandial (see pump download)   Activity level: Busy with Family and work  Lifestyle modification: Diet  Patient is up to date with eye exam and denied any h/o diabetic retinopathy   Podiatry Exam:self care   Microvascular complications: no nephropathy, retinopathy, or neuropathy  Macrovascular complications: no CAD, PVD, or CVA    2. Hyperlipidemia - on statin.      PAST MEDICAL HISTORY   Past Medical History:   Diagnosis Date    Depression     DM type 1 (diabetes mellitus, type 1) ([facility])     On insulin Pump     Hyperlipidemia     Insulin pump in place     Vitamin D deficiency      PAST SURGICAL HISTORY   No past surgical history on file.  SOCIAL HISTORY   Tobacco:   reports that she has never smoked. She has never used smokeless tobacco.  Alcol:   reports current alcohol use. Illicit Drugs:   reports no history of drug use. FAMILY HISTORY   Family History   Problem Relation Age of Onset    COPD Father     Heart Disease Father     Diabetes Paternal Grandmother     Heart Disease Paternal Grandfather      ALLERGIES AND DRUG REACTIONS   Allergies   Allergen Reactions    Tramadol      Other reaction(s): Other See Comments  Syncope       CURRENT MEDICATIONS     Current Outpatient Medications   Medication Sig Dispense Refill    Continuous Blood Gluc  (DEXCOM G6 ) TIKI To use with G6 sensor 1 Device 0    Continuous Blood Gluc Sensor (DEXCOM G6 SENSOR) MISC Change every 10 days 9 each 3    Continuous Blood Gluc Transmit (DEXCOM G6 TRANSMITTER) MISC To use with sensors 1 each 0    Continuous Blood Gluc Transmit (DEXCOM G6 TRANSMITTER) MISC To change every 3 months 1 each 3    Continuous Blood Gluc Sensor (DEXCOM G6 SENSOR) MISC To change every 10 days 3 each 11    insulin aspart (NOVOLOG) 100 UNIT/ML injection vial USE VIA INSULIN PUMP. MAX  UNITS PER DAY 30 mL 3    Blood Glucose Monitoring Suppl (ONETOUCH VERIO FLEX SYSTEM) w/Device KIT Use to test blood sugar as directed 1 kit 1    blood glucose test strips (ONETOUCH VERIO) strip Use to test blood sugar 4 times a day 200 each 5    blood glucose test strips (ONE TOUCH ULTRA TEST) strip 1 each by In Vitro route 4 times daily As needed.  150 each 5    CONTOUR NEXT TEST strip Use to test blood sugar 4-6 times a day with medtronic insulin pump 200 each 5    lisinopril (PRINIVIL;ZESTRIL) 5 MG tablet Take 5 mg by mouth daily      meclizine (ANTIVERT) 25 MG tablet Take 25 mg by mouth as needed       naproxen (NAPROSYN) 500 MG tablet Take 500 mg by mouth as needed       sertraline (ZOLOFT) 50 MG tablet Take 50 mg by mouth daily      simvastatin (ZOCOR) 20 MG tablet Ulcers, No acanthosis nigricans. Insulin pump insertion site looks normal    Neuro: CN intact, Monofilament sensation arian bilateral , muscle power normal  Psych: normal mood, and affect    Review of Laboratory Data:  I have reviewed the following:VITAMIN D 25-OH 6/8/2019  Component Name Value   Vitamin D, 25-OH,D2 45.2     No results found for: WBC, RBC, HGB, HCT, MCV, MCH, MCHC, RDW, PLT, MPV, GRANULOCYTES, BANDS   Lab Results   Component Value Date/Time     05/04/2020    K 4.2 05/04/2020    CALCIUM 8.8 05/04/2020      No results found for: TSH, T4FREE, W9RFXVH, FT3, R8JUTCP, TSI, TPOABS, THGAB  Lab Results   Component Value Date    LABA1C 6.9 05/04/2020     No results found for: CHOL, TRIG, HDL, LDLDIRECT  No results found for: VITD25     Medical Records/Labs/Images review:   I personally reviewed and summarized previous records   All labs were reviewed independently     Almita 54, a 45 y.o.-old female seen in for the following issues     Diabetes Mellitus Type 1   · A1c 6.8% with less hypoglycemic episodes   · Will change pump settings to: Basal Insulin 12a 0.625, 5a 0.625, 12p 0.750, 5p 0.750, 10p 0.70, CR 13 , ISF 12a 85 , 7a 85 , 9p 85 , Target: 115 -120 Active Insulin: 3;30 hours  · Using g6 DEXCOM CGM  · Patient up to date with the routine diabetes maintenance and prevention   Pt is scheduled for hysterectomy in few days. We recommended change pump settings right before surgery and the day of surgery to --> Basal Insulin 12a 0.475, 5a 0.5, 12p 0.550, 5p 0.550, 10p 0.4750, CR 13 , ISF 12a 85 , 7a 85 , 9p 85 , Target: 115 -120 Active Insulin: 3;30 hours (pt was provide with instructions and she knows how to change the settings)   From endocrine standpoint, the patient is medically stable for upcoming surgery as planned.  Please let me know if you need any further information     Hyperlipidemia  · At goal, continue Zocor 20 mg daily     Low vitamin D  · Continue vitD supplement     Return in about 4 months (around 3/16/2021) for DM type 1 on insulin . The above issues were reviewed with the patient who understood and agreed with the plan  Thank you for allowing us to participate in the care of this patient. Please do not hesitate to contact us with any additional questions. Diagnosis Orders   1. Type 1 diabetes mellitus without complication (HCC)  insulin aspart (NOVOLOG) 100 UNIT/ML injection vial   2. Insulin pump in place     3. Hyperlipidemia, unspecified hyperlipidemia type     4. Vitamin D deficiency     5. Hypoglycemia         Delmar Alvarez MD  Endocrinologist, [facility])   [address] [place] [address], [address] [place] [address],[address] [id number]   Phone: [phone]  Fax: [phone]  --------------------------------------------  An electronic signature was used to authenticate this note.  Bryant Fields MD on 11/16/2020 at 1:45 PM

## 2021-03-16 ENCOUNTER — OFFICE VISIT (OUTPATIENT)
Dept: ENDOCRINOLOGY | Age: 51
End: 2021-03-16
Payer: COMMERCIAL

## 2021-03-16 VITALS
HEART RATE: 70 BPM | TEMPERATURE: 97.5 F | DIASTOLIC BLOOD PRESSURE: 76 MMHG | BODY MASS INDEX: 42.69 KG/M2 | WEIGHT: 232 LBS | SYSTOLIC BLOOD PRESSURE: 124 MMHG | OXYGEN SATURATION: 97 % | HEIGHT: 62 IN

## 2021-03-16 DIAGNOSIS — E55.9 VITAMIN D DEFICIENCY: Primary | ICD-10-CM

## 2021-03-16 DIAGNOSIS — E10.9 TYPE 1 DIABETES MELLITUS WITHOUT COMPLICATION (HCC): ICD-10-CM

## 2021-03-16 DIAGNOSIS — Z96.41 INSULIN PUMP IN PLACE: ICD-10-CM

## 2021-03-16 DIAGNOSIS — E16.2 HYPOGLYCEMIA: ICD-10-CM

## 2021-03-16 DIAGNOSIS — E78.5 HYPERLIPIDEMIA, UNSPECIFIED HYPERLIPIDEMIA TYPE: ICD-10-CM

## 2021-03-16 LAB — HBA1C MFR BLD: 6.7 %

## 2021-03-16 PROCEDURE — 83036 HEMOGLOBIN GLYCOSYLATED A1C: CPT | Performed by: INTERNAL MEDICINE

## 2021-03-16 PROCEDURE — 99214 OFFICE O/P EST MOD 30 MIN: CPT | Performed by: INTERNAL MEDICINE

## 2021-03-16 NOTE — PROGRESS NOTES
facility-administered medications for this visit. Review of Systems  Constitutional: No fever, no chills, no diaphoresis, no generalized weakness. HEENT: No blurred vision, No sore throat, no ear pain, no hair loss  Neck: denied any neck swelling, difficulty swallowing,   Cardio-pulmonary: No CP, SOB or palpitation, No orthopnea or PND. No cough or wheezing. GI: No N/V/D, no constipation, No abdominal pain, no melena or hematochezia   : Denied any dysuria, hematuria, flank pain, discharge, or incontinence. Skin: denied any rash, ulcer, Hirsute, or hyperpigmentation. MSK: denied any joint deformity, joint pain/swelling, muscle pain, or back pain. Neuro: no numbness, no tingling, no weakness,  OBJECTIVE    /76   Pulse 70   Temp 97.5 °F (36.4 °C) (Temporal)   Ht 5' 2\" (1.575 m)   Wt 232 lb (105.2 kg)   SpO2 97%   BMI 42.43 kg/m²   BP Readings from Last 4 Encounters:   03/16/21 124/76   11/16/20 128/78   10/16/19 132/78   07/10/19 128/82     Wt Readings from Last 6 Encounters:   03/16/21 232 lb (105.2 kg)   11/16/20 235 lb 12.8 oz (107 kg)   10/16/19 227 lb 9.6 oz (103.2 kg)   07/10/19 223 lb 12.8 oz (101.5 kg)   04/03/19 227 lb 3.2 oz (103.1 kg)   01/03/19 233 lb 9.6 oz (106 kg)     Physical examination:  General: awake alert, oriented x3, no abnormal position or movements. HEENT: normocephalic non-traumatic, no exophthalmos   Neck: supple, no LN enlargement, no thyromegaly, no thyroid tenderness, no JVD. Pulm: Clear equal air entry no added sounds, no wheezing or rhonchi    CVS: S1 + S2, no murmur, no heave. Dorsalis pedis pulse palpable   Abd: soft lax, no tenderness, no organomegaly, audible bowel sounds. Skin: warm, no lesions, no rash. No callus, no Ulcers, No acanthosis nigricans.  Insulin pump insertion site looks normal    Neuro: CN intact, Monofilament sensation arian bilateral , muscle power normal  Psych: normal mood, and affect    Review of Laboratory Data:  I have reviewed the following:VITAMIN D 25-OH 6/8/2019  Component Name Value   Vitamin D, 25-OH,D2 45.2     No results found for: WBC, RBC, HGB, HCT, MCV, MCH, MCHC, RDW, PLT, MPV, GRANULOCYTES, BANDS   Lab Results   Component Value Date/Time     05/04/2020    K 4.2 05/04/2020    CALCIUM 8.8 05/04/2020      No results found for: TSH, T4FREE, P2ABPBV, FT3, I3NVCEB, TSI, TPOABS, THGAB  Lab Results   Component Value Date    LABA1C 6.7 03/16/2021     No results found for: CHOL, TRIG, HDL, LDLDIRECT  No results found for: [address] [facility] Ryanne Campos, a 45 y.o.-old female seen in for the following issues     Diabetes Mellitus Type 1   · A1c 6.8% with less hypoglycemic episodes   · Will change pump settings to: 12a 0.625, 5:30a 0.625, 12p 0.725, 10p 0.70, CR 12a 13, 12p 12, 10p 13, , ISF 85, Target: 110, Active Insulin: 3;30 hours  · Using g6 DEXCOM CGM  · Patient up to date with the routine diabetes maintenance and prevention     Hypoglycemia  · Improved comparing with last visit   · Continue using Pump and CGM     Hyperlipidemia  · Continue Zocor 20 mg daily     Low vitamin D  · Continue vitD supplement    I personally reviewed external notes from PCP and other patient's care team providers, and personally interpreted labs associated with the above diagnosis. I also ordered labs to further assess and manage the above addressed medical conditions    Return in about 4 months (around 7/16/2021) for DM type 1, VitD deficiency. The above issues were reviewed with the patient who understood and agreed with the plan  Thank you for allowing us to participate in the care of this patient. Please do not hesitate to contact us with any additional questions. Diagnosis Orders   1. Vitamin D deficiency  Vitamin D 25 Hydroxy   2.  Type 1 diabetes mellitus without complication (HCC)  insulin aspart (NOVOLOG) 100 UNIT/ML injection vial    POCT glycosylated hemoglobin (Hb A1C)    Basic Metabolic Panel    Hemoglobin A1C    Lipid Panel    Microalbumin / Creatinine Urine Ratio   3. Hypoglycemia     4. Hyperlipidemia, unspecified hyperlipidemia type     5. Insulin pump in place       Klarissa Baig MD  Endocrinologist, [address], [address] [place] [address],[facility] [address] [id number]   Phone: [phone]  Fax: [phone]  --------------------------------------------  An electronic signature was used to authenticate this note.  Eliot Whitten MD on 3/16/2021 at 1:34 PM

## 2021-07-17 LAB
AVERAGE GLUCOSE: NORMAL
BUN BLDV-MCNC: NORMAL MG/DL
CALCIUM SERPL-MCNC: 8.7 MG/DL
CHLORIDE BLD-SCNC: 101 MMOL/L
CHOLESTEROL, TOTAL: 168 MG/DL
CHOLESTEROL/HDL RATIO: 2.2
CO2: 28 MMOL/L
CREAT SERPL-MCNC: 0.73 MG/DL
GFR CALCULATED: 95
GLUCOSE BLD-MCNC: 174 MG/DL
HBA1C MFR BLD: 6.4 %
HDLC SERPL-MCNC: 76 MG/DL (ref 35–70)
LDL CHOLESTEROL CALCULATED: 80 MG/DL (ref 0–160)
NONHDLC SERPL-MCNC: ABNORMAL MG/DL
POTASSIUM SERPL-SCNC: 4.1 MMOL/L
SODIUM BLD-SCNC: 135 MMOL/L
TRIGL SERPL-MCNC: 59 MG/DL
VITAMIN D 25-HYDROXY: 38
VITAMIN D2, 25 HYDROXY: NORMAL
VITAMIN D3,25 HYDROXY: NORMAL
VLDLC SERPL CALC-MCNC: ABNORMAL MG/DL

## 2021-07-20 DIAGNOSIS — E10.9 TYPE 1 DIABETES MELLITUS WITHOUT COMPLICATION (HCC): ICD-10-CM

## 2021-07-20 DIAGNOSIS — E55.9 VITAMIN D DEFICIENCY: ICD-10-CM

## 2021-07-28 ENCOUNTER — OFFICE VISIT (OUTPATIENT)
Dept: ENDOCRINOLOGY | Age: 51
End: 2021-07-28
Payer: COMMERCIAL

## 2021-07-28 VITALS
WEIGHT: 243.8 LBS | BODY MASS INDEX: 44.87 KG/M2 | HEIGHT: 62 IN | DIASTOLIC BLOOD PRESSURE: 81 MMHG | SYSTOLIC BLOOD PRESSURE: 128 MMHG | HEART RATE: 69 BPM

## 2021-07-28 DIAGNOSIS — E10.9 TYPE 1 DIABETES MELLITUS WITHOUT COMPLICATION (HCC): Primary | ICD-10-CM

## 2021-07-28 DIAGNOSIS — Z96.41 INSULIN PUMP IN PLACE: ICD-10-CM

## 2021-07-28 DIAGNOSIS — E10.649 TYPE 1 DIABETES MELLITUS WITH HYPOGLYCEMIA AND WITHOUT COMA (HCC): ICD-10-CM

## 2021-07-28 DIAGNOSIS — E78.5 HYPERLIPIDEMIA, UNSPECIFIED HYPERLIPIDEMIA TYPE: ICD-10-CM

## 2021-07-28 DIAGNOSIS — E55.9 VITAMIN D DEFICIENCY: ICD-10-CM

## 2021-07-28 DIAGNOSIS — E66.01 CLASS 3 SEVERE OBESITY DUE TO EXCESS CALORIES WITHOUT SERIOUS COMORBIDITY WITH BODY MASS INDEX (BMI) OF 40.0 TO 44.9 IN ADULT (HCC): ICD-10-CM

## 2021-07-28 PROCEDURE — 99214 OFFICE O/P EST MOD 30 MIN: CPT | Performed by: CLINICAL NURSE SPECIALIST

## 2021-07-28 PROCEDURE — 95251 CONT GLUC MNTR ANALYSIS I&R: CPT | Performed by: CLINICAL NURSE SPECIALIST

## 2021-07-28 NOTE — PROGRESS NOTES
[address] [place] [address] Department of Endocrinology Diabetes and Metabolism   [address] [facility] [address]   Phone: [phone]  Fax: [phone]    Date of Service: 7/28/2021    Primary Care Physician: No primary care provider on file. Referring physician: No ref. provider found  Provider: Michelle Bledsoe     Reason for the visit:  Type I DM on insulin Pump, vitD deficiency, Hyperlipidemia      History of Present Illness: The history is provided by the patient. No  was used. Accuracy of the patient data is excellent. Sera Mayes is a very pleasant 45 y.o. female seen today for follow up visit      Sera Mayes was diagnosed with diabetes in 2001 and has been on insulin Pump for many years   Currently on Tandem with Dexcom   Current settings:  Basal Insulin:12a 0.625, 5:30a 0.675, 12p 0.75, 10p 0.70, CR 0000 1:13, 530 am 1:13, 12 pm 1:12, 10pm 1:13,  , ISF 85, Target: 110, Active Insulin: 3;30 hours  CGM skin reaction much  improved since switching to dexcom   Average 137  CGM TIR 71%  < 1 % hypoglycemia   Lab Results   Component Value Date    LABA1C 6.4 07/17/2021    LABA1C 6.7 03/16/2021    LABA1C 6.9 05/04/2020   hypoglycemia infrequent   Activity level: denies exercise   Lifestyle modification: Diet 3 meals , eats sweets   Patient is up to date with eye exam 8/2020 and denied any h/o diabetic retinopathy   Podiatry Exam:self care   Microvascular complications: no nephropathy, retinopathy, or neuropathy  Macrovascular complications: no CAD, PVD, or CVA  On statin     PAST MEDICAL HISTORY   Past Medical History:   Diagnosis Date    Depression     DM type 1 (diabetes mellitus, type 1) (HCC)     On insulin Pump     Hyperlipidemia     Insulin pump in place     Vitamin D deficiency        PAST SURGICAL HISTORY   No past surgical history on file. SOCIAL HISTORY   Tobacco:   reports that she has never smoked.  She has never used smokeless tobacco.  Alcohol: reports current alcohol use. Drugs:   reports no history of drug use. FAMILY HISTORY   Family History   Problem Relation Age of Onset    COPD Father     Heart Disease Father     Diabetes Paternal Grandmother     Heart Disease Paternal Grandfather        ALLERGIES AND DRUG REACTIONS   Allergies   Allergen Reactions    Tramadol      Other reaction(s): Other See Comments  Syncope       CURRENT MEDICATIONS   Current Outpatient Medications   Medication Sig Dispense Refill    insulin aspart (NOVOLOG) 100 UNIT/ML injection vial USE VIA INSULIN PUMP. MAX  UNITS PER DAY 3 vial 11    Continuous Blood Gluc  (DEXCOM G6 ) TIKI To use with G6 sensor 1 Device 0    Continuous Blood Gluc Sensor (DEXCOM G6 SENSOR) MISC Change every 10 days 9 each 3    Continuous Blood Gluc Transmit (DEXCOM G6 TRANSMITTER) MISC To use with sensors 1 each 0    Continuous Blood Gluc Transmit (DEXCOM G6 TRANSMITTER) MISC To change every 3 months 1 each 3    Continuous Blood Gluc Sensor (DEXCOM G6 SENSOR) MISC To change every 10 days 3 each 11    Blood Glucose Monitoring Suppl (ONETOUCH VERIO FLEX SYSTEM) w/Device KIT Use to test blood sugar as directed 1 kit 1    blood glucose test strips (ONETOUCH VERIO) strip Use to test blood sugar 4 times a day 200 each 5    blood glucose test strips (ONE TOUCH ULTRA TEST) strip 1 each by In Vitro route 4 times daily As needed. 150 each 5    CONTOUR NEXT TEST strip Use to test blood sugar 4-6 times a day with medtronic insulin pump 200 each 5    lisinopril (PRINIVIL;ZESTRIL) 5 MG tablet Take 5 mg by mouth daily      meclizine (ANTIVERT) 25 MG tablet Take 25 mg by mouth as needed       naproxen (NAPROSYN) 500 MG tablet Take 500 mg by mouth as needed       sertraline (ZOLOFT) 50 MG tablet Take 50 mg by mouth daily      simvastatin (ZOCOR) 20 MG tablet Take 1 tablet by mouth nightly 90 tablet 1     No current facility-administered medications for this visit.        Review of Systems  Constitutional: No fever, no chills, no diaphoresis, no generalized weakness. HEENT: No blurred vision, No sore throat, no ear pain, no hair loss  Neck: denied any neck swelling, difficulty swallowing,   Cardio-pulmonary: No CP, SOB or palpitation, No orthopnea or PND. No cough or wheezing. GI: No N/V/D, no constipation, No abdominal pain, no melena or hematochezia   : Denied any dysuria, hematuria, flank pain, discharge, or incontinence. Skin: denied any rash, ulcer, Hirsute, or hyperpigmentation. MSK: denied any joint deformity, joint pain/swelling, muscle pain, or back pain. Neuro: no numbness, no tingling, no weakness, _    OBJECTIVE    /81   Pulse 69   Ht 5' 2\" (1.575 m)   Wt 243 lb 12.8 oz (110.6 kg)   BMI 44.59 kg/m²   BP Readings from Last 4 Encounters:   07/28/21 128/81   03/16/21 124/76   11/16/20 128/78   10/16/19 132/78     Wt Readings from Last 6 Encounters:   07/28/21 243 lb 12.8 oz (110.6 kg)   03/16/21 232 lb (105.2 kg)   11/16/20 235 lb 12.8 oz (107 kg)   10/16/19 227 lb 9.6 oz (103.2 kg)   07/10/19 223 lb 12.8 oz (101.5 kg)   04/03/19 227 lb 3.2 oz (103.1 kg)       Physical examination:  General: awake alert, oriented x3, no abnormal position or movements. HEENT: normocephalic non-traumatic, no exophthalmos   Neck: supple, no LN enlargement, no thyromegaly, no thyroid tenderness, no JVD. Pulm: Clear equal air entry no added sounds, no wheezing or rhonchi    CVS: S1 + S2, no murmur, no heave. Dorsalis pedis pulse palpable   Abd: soft lax, no tenderness, no organomegaly, audible bowel sounds. Obese  Skin: warm, no lesions, no rash.  No callus, no Ulcers, No acanthosis nigricans  Musculoskeletal: No back tenderness, no kyphosis/scoliosis    Neuro: CN intact, Monofilament sensation decreased bilateral , muscle power normal  Psych: normal mood, and affect      Review of Laboratory Data:  I personally reviewed the following lab:  No results found for: WBC, RBC, HGB, HCT, MCV, MCH, MCHC, RDW, PLT, MPV, GRANULOCYTES, BANDS   Lab Results   Component Value Date/Time     07/17/2021 12:00 AM    K 4.1 07/17/2021 12:00 AM    CO2 28.0 07/17/2021 12:00 AM    CREATININE 0.73 07/17/2021 12:00 AM    CALCIUM 8.7 07/17/2021 12:00 AM    LABGLOM 95 07/17/2021 12:00 AM      No results found for: TSH, T4FREE, O3YNZKY, FT3, Y4HUQAZ, TSI, TPOABS, THGAB  Lab Results   Component Value Date    LABA1C 6.4 07/17/2021    GLUCOSE 174 07/17/2021     Lab Results   Component Value Date    LABA1C 6.4 07/17/2021    LABA1C 6.7 03/16/2021    LABA1C 6.9 05/04/2020     Lab Results   Component Value Date    TRIG 59 07/17/2021    HDL 76 07/17/2021    LDLCALC 80 07/17/2021    CHOL 168 07/17/2021     Lab Results   Component Value Date    VITD25 38 07/17/2021       ASSESSMENT & RECOMMENDATIONS   Zachery Holley, a 45 y.o.-old female seen in for the following issues      Diagnosis Orders   1. Type 1 diabetes mellitus without complication (HCC)  HM DIABETES FOOT EXAM   2. Type 1 diabetes mellitus with hypoglycemia and without coma ([facility] [address]     3. Hyperlipidemia, unspecified hyperlipidemia type     4. Vitamin D deficiency     5. Insulin pump in place     6. Class 3 severe obesity due to excess calories without serious comorbidity with body mass index (BMI) of 40.0 to 44.9 in adult [facility])         Diabetes Mellitus Type     · Patient's diabetes is usually well controlled. Very infrequent hypoglycemia. Insulin pump and CGM reviewed. Time in range 71%. Less than 1% hypoglycemia.   · Plan:   · Continue insulin pump settings as below    Basal Insulin:12a 0.625, 5:30a 0.675, 12p 0.75, 10p 0.70, CR 0000 1:13, 530 am 1:13, 12 pm 1:12, 10pm 1:13,  , ISF 85, Target: 110, Active Insulin: 3;30 hours    · The patient was advised to check blood sugars times as needed   · Discussed with patient A1c and blood sugar goals   · Optimal blood sugars: 100-140 pre-prandial, < 180 peak post-prandial  · The patient counseled about the complications of uncontrolled diabetes   · Patient was counselled about the importance of self-blood glucose monitoring and eating consistent carb diet to avoid blood sugar fluctuations   · Patient will need routine diabetes maintenance and prevention  · The patient was referred to diabetic educator for further teaching   · Discussed lifestyle changes including diet and exercise with patient; recommended 150 minutes of moderate intensity exercise per week. Obesity   Discussed lifestyle changes including diet and exercise with patient in depth. Also discussed with patient cardiovascular risk associated with obesity    . Hyperlipidemia  -Continue statin. Encourage diet and exercise    Vitamin D deficiency  Patient has not been on any vitamin D supplementation recently. Last vitamin D within normal limits. Will hold on vitamin D for now      Return in about 3 months (around 10/28/2021). The above issues were reviewed with the patient who understood and agreed with the plan. Thank you for allowing us to participate in the care of this patient. Please do not hesitate to contact us with any additional questions. Courtney WATSON   Endocrinologist, [facility]   [address] [place] [address]   Phone: [phone]  Fax: [phone]  --------------------------------------------  An electronic signature was used to authenticate this note.  Courtney WATSON on 7/28/2021 at 1:26 PM

## 2021-12-14 ENCOUNTER — OFFICE VISIT (OUTPATIENT)
Dept: ENDOCRINOLOGY | Age: 51
End: 2021-12-14
Payer: COMMERCIAL

## 2021-12-14 VITALS
HEIGHT: 62 IN | DIASTOLIC BLOOD PRESSURE: 93 MMHG | SYSTOLIC BLOOD PRESSURE: 139 MMHG | HEART RATE: 75 BPM | BODY MASS INDEX: 45.82 KG/M2 | WEIGHT: 249 LBS | OXYGEN SATURATION: 96 %

## 2021-12-14 DIAGNOSIS — E66.01 MORBID OBESITY (HCC): ICD-10-CM

## 2021-12-14 DIAGNOSIS — Z96.41 INSULIN PUMP IN PLACE: ICD-10-CM

## 2021-12-14 DIAGNOSIS — E78.5 HYPERLIPIDEMIA, UNSPECIFIED HYPERLIPIDEMIA TYPE: ICD-10-CM

## 2021-12-14 DIAGNOSIS — E10.9 TYPE 1 DIABETES MELLITUS WITHOUT COMPLICATION (HCC): ICD-10-CM

## 2021-12-14 DIAGNOSIS — E55.9 VITAMIN D DEFICIENCY: Primary | ICD-10-CM

## 2021-12-14 PROCEDURE — 99214 OFFICE O/P EST MOD 30 MIN: CPT | Performed by: INTERNAL MEDICINE

## 2021-12-14 PROCEDURE — 95251 CONT GLUC MNTR ANALYSIS I&R: CPT | Performed by: INTERNAL MEDICINE

## 2021-12-14 RX ORDER — DULOXETIN HYDROCHLORIDE 60 MG/1
CAPSULE, DELAYED RELEASE ORAL
COMMUNITY
Start: 2021-11-08

## 2021-12-14 NOTE — PROGRESS NOTES
[address] [place] [address] Department of Endocrinology Diabetes and Metabolism   [address] [facility] [address]   Phone: [phone]  Fax: [phone]    Date of Service: 12/14/21    Primary Care Physician: Margie Rangel DO  Provider: Ronda Vaughn MD     Reason for the visit:  Type I DM on insulin Pump, vitD deficiency, Hyperlipidemia     History of Present Illness: The history is provided by the patient. No  was used. Accuracy of the patient data is excellent. Kadie Garcia is a very pleasant 45 y.o. female seen today for follow up visit     Kadie Garcia was diagnosed with diabetes in 2001 and has been on insulin Pump for many years   Currently on T-slim pump + DEXCOM CGM   Current settings:  Basal Insulin:12a 0.625, 5:30a 0.625, 12p 0.750, 10p 0.70, CR 13 , ISF 85, Target: 110, Active Insulin: 3;30 hours  Continues to have skin reaction with tape from sensors, has tried a number of suggestions without benefit  Lab Results   Component Value Date    LABA1C 6.4 07/17/2021    LABA1C 6.7 03/16/2021    LABA1C 6.9 05/04/2020    LABA1C 6.8 10/16/2019   no hypoglycemia   Activity level: always busy with Family and work  Lifestyle modification: Diet  Patient is up to date with eye exam and denied any h/o diabetic retinopathy   Podiatry Exam:self care   Microvascular complications: no nephropathy, retinopathy, or neuropathy  Macrovascular complications: no CAD, PVD, or CVA    2. Hyperlipidemia - on statin. PAST MEDICAL HISTORY   Past Medical History:   Diagnosis Date    Depression     DM type 1 (diabetes mellitus, type 1) (HCC)     On insulin Pump     Hyperlipidemia     Insulin pump in place     Vitamin D deficiency      PAST SURGICAL HISTORY   No past surgical history on file. SOCIAL HISTORY   Tobacco:   reports that she has never smoked. She has never used smokeless tobacco.  Alcol:   reports current alcohol use.   Illicit Drugs:   reports no history of drug use.    FAMILY HISTORY   Family History   Problem Relation Age of Onset    COPD Father     Heart Disease Father     Diabetes Paternal Grandmother     Heart Disease Paternal Grandfather      ALLERGIES AND DRUG REACTIONS   Allergies   Allergen Reactions    Tramadol      Other reaction(s): Other See Comments  Syncope       CURRENT MEDICATIONS     Current Outpatient Medications   Medication Sig Dispense Refill    insulin aspart (NOVOLOG) 100 UNIT/ML injection vial USE VIA INSULIN PUMP. MAX  UNITS PER DAY 30 mL 3    Continuous Blood Gluc Sensor (DEXCOM G6 SENSOR) MISC To change every 10 days 3 each 11    DULoxetine (CYMBALTA) 60 MG extended release capsule TAKE 1 CAPSULE BY MOUTH DAILY      Continuous Blood Gluc  (DEXCOM G6 ) TIKI To use with G6 sensor 1 Device 0    Continuous Blood Gluc Sensor (DEXCOM G6 SENSOR) MISC Change every 10 days 9 each 3    Continuous Blood Gluc Transmit (DEXCOM G6 TRANSMITTER) MISC To use with sensors 1 each 0    Continuous Blood Gluc Transmit (DEXCOM G6 TRANSMITTER) MISC To change every 3 months 1 each 3    Blood Glucose Monitoring Suppl (ONETOUCH VERIO FLEX SYSTEM) w/Device KIT Use to test blood sugar as directed 1 kit 1    blood glucose test strips (ONETOUCH VERIO) strip Use to test blood sugar 4 times a day 200 each 5    blood glucose test strips (ONE TOUCH ULTRA TEST) strip 1 each by In Vitro route 4 times daily As needed. 150 each 5    CONTOUR NEXT TEST strip Use to test blood sugar 4-6 times a day with medtronic insulin pump 200 each 5    lisinopril (PRINIVIL;ZESTRIL) 5 MG tablet Take 5 mg by mouth daily      meclizine (ANTIVERT) 25 MG tablet Take 25 mg by mouth as needed       naproxen (NAPROSYN) 500 MG tablet Take 500 mg by mouth as needed       simvastatin (ZOCOR) 20 MG tablet Take 1 tablet by mouth nightly 90 tablet 1     No current facility-administered medications for this visit.        Review of Systems  Constitutional: No fever, no chills, no diaphoresis, no generalized weakness. HEENT: No blurred vision, No sore throat, no ear pain, no hair loss  Neck: denied any neck swelling, difficulty swallowing,   Cardio-pulmonary: No CP, SOB or palpitation, No orthopnea or PND. No cough or wheezing. GI: No N/V/D, no constipation, No abdominal pain, no melena or hematochezia   : Denied any dysuria, hematuria, flank pain, discharge, or incontinence. Skin: denied any rash, ulcer, Hirsute, or hyperpigmentation. MSK: denied any joint deformity, joint pain/swelling, muscle pain, or back pain. Neuro: no numbness, no tingling, no weakness,  OBJECTIVE    BP (!) 139/93   Pulse 75   Ht 5' 2\" (1.575 m)   Wt 249 lb (112.9 kg)   LMP 12/20/2018   SpO2 96%   BMI 45.54 kg/m²   BP Readings from Last 4 Encounters:   12/14/21 (!) 139/93   07/28/21 128/81   03/16/21 124/76   11/16/20 128/78     Wt Readings from Last 6 Encounters:   12/14/21 249 lb (112.9 kg)   07/28/21 243 lb 12.8 oz (110.6 kg)   03/16/21 232 lb (105.2 kg)   11/16/20 235 lb 12.8 oz (107 kg)   10/16/19 227 lb 9.6 oz (103.2 kg)   07/10/19 223 lb 12.8 oz (101.5 kg)     Physical examination:  General: awake alert, oriented x3, no abnormal position or movements. HEENT: normocephalic non-traumatic, no exophthalmos   Neck: supple, no LN enlargement, no thyromegaly, no thyroid tenderness, no JVD. Pulm: Clear equal air entry no added sounds, no wheezing or rhonchi    CVS: S1 + S2, no murmur, no heave. Dorsalis pedis pulse palpable   Abd: soft lax, no tenderness, no organomegaly, audible bowel sounds. Skin: warm, no lesions, no rash. No callus, no Ulcers, No acanthosis nigricans.  Insulin pump insertion site looks normal    Neuro: CN intact, Monofilament sensation arian bilateral , muscle power normal  Psych: normal mood, and affect    Review of Laboratory Data:  I have reviewed the following:VITAMIN D 25-OH 6/8/2019  Component Name Value   Vitamin D, 25-OH,D2 45.2     No results found for: WBC, RBC, HGB, HCT, MCV, MCH, MCHC, RDW, PLT, MPV, GRANULOCYTES, BANDS   Lab Results   Component Value Date/Time     07/17/2021 12:00 AM    K 4.1 07/17/2021 12:00 AM    CO2 28.0 07/17/2021 12:00 AM    CALCIUM 8.7 07/17/2021 12:00 AM      No results found for: TSH, T4FREE, C3YINSO, FT3, H8GDMUX, TSI, TPOABS, THGAB  Lab Results   Component Value Date    LABA1C 6.4 07/17/2021    GLUCOSE 174 07/17/2021     Lab Results   Component Value Date    CHOL 168 07/17/2021    TRIG 59 07/17/2021    HDL 76 07/17/2021     Lab Results   Component Value Date    VITD25 38 07/17/2021        ASSESSMENT & RECOMMENDATIONS   Kadie Garcia, a 45 y.o.-old female seen in for the following issues     Diabetes Mellitus Type 1   · Under good control, A1c 6.9%   · I have reviewed both pump anf CGM download, will continue current pump settings: Basal Insulin:12a 0.625, 5:30a 0.625, 12p 0.750, 10p 0.70, CR 13 , ISF 85, Target: 110, Active Insulin: 3;30 hours  · Using g6 DEXCOM CGM  · Patient up to date with the routine diabetes maintenance and prevention     Continuous Glucose Monitoring (CGM) download and interpretation    I personally reviewed and interpreted continuous glucose monitor (CGM) download. CGM report was discussed with patient including blood glucose patterns, percentages of blood glucose at goal, above goal and below goal. Insulin dosages/antidiabetic regimen was adjusted according to CGM download. Full CGM was scanned under media. Hypoglycemia  · Improved   · Continue using Pump and CGM     Hyperlipidemia  · Continue Zocor 20 mg daily     Low vitamin D  · Continue vitD supplement    Morbid obesity  · Will refer to Wt loss clinic     I personally reviewed external notes from PCP and other patient's care team providers, and personally interpreted labs associated with the above diagnosis.  I also ordered labs to further assess and manage the above addressed medical conditions    Return in about 4 months (around 4/14/2022) for DM type 1, VitD deficiency, Hyperlipidemia. The above issues were reviewed with the patient who understood and agreed with the plan  Thank you for allowing us to participate in the care of this patient. Please do not hesitate to contact us with any additional questions. Diagnosis Orders   1. Vitamin D deficiency  Vitamin D 25 Hydroxy    Basic Metabolic Panel   2. Type 1 diabetes mellitus without complication (HCC)  TSH without Reflex    Basic Metabolic Panel    Lipid Panel    Hemoglobin A1C    Microalbumin / Creatinine Urine Ratio    insulin aspart (NOVOLOG) 100 UNIT/ML injection vial    VA CONTINUOUS GLUCOSE MONITORING ANALYSIS I&R   3. Hyperlipidemia, unspecified hyperlipidemia type  Lipid Panel   4. Insulin pump in place     5. Morbid obesity [facility])  Chris Garcia MD, [facility], Surgical Weight Loss [address] [facility] MD  Endocrinologist, [address] [address]   Phone: [phone]  Fax: [phone]  --------------------------------------------  An electronic signature was used to authenticate this note.  Mich Blankenship MD on 12/14/2021 at 8:46 AM

## 2022-01-24 ENCOUNTER — OFFICE VISIT (OUTPATIENT)
Dept: BARIATRICS/WEIGHT MGMT | Age: 52
End: 2022-01-24
Payer: COMMERCIAL

## 2022-01-24 VITALS
TEMPERATURE: 97 F | HEIGHT: 62 IN | WEIGHT: 245.2 LBS | BODY MASS INDEX: 45.12 KG/M2 | DIASTOLIC BLOOD PRESSURE: 83 MMHG | HEART RATE: 73 BPM | SYSTOLIC BLOOD PRESSURE: 151 MMHG

## 2022-01-24 DIAGNOSIS — E78.5 HYPERLIPIDEMIA, UNSPECIFIED HYPERLIPIDEMIA TYPE: ICD-10-CM

## 2022-01-24 DIAGNOSIS — R53.82 CHRONIC FATIGUE: Primary | ICD-10-CM

## 2022-01-24 DIAGNOSIS — I10 ESSENTIAL HYPERTENSION: ICD-10-CM

## 2022-01-24 DIAGNOSIS — E66.01 CLASS 3 SEVERE OBESITY DUE TO EXCESS CALORIES WITHOUT SERIOUS COMORBIDITY WITH BODY MASS INDEX (BMI) OF 40.0 TO 44.9 IN ADULT (HCC): ICD-10-CM

## 2022-01-24 PROBLEM — R53.83 FATIGUE: Status: ACTIVE | Noted: 2022-01-24

## 2022-01-24 PROBLEM — E66.813 CLASS 3 SEVERE OBESITY DUE TO EXCESS CALORIES WITH SERIOUS COMORBIDITY AND BODY MASS INDEX (BMI) OF 40.0 TO 44.9 IN ADULT: Status: RESOLVED | Noted: 2022-01-24 | Resolved: 2022-01-24

## 2022-01-24 PROBLEM — E66.813 CLASS 3 SEVERE OBESITY DUE TO EXCESS CALORIES WITH SERIOUS COMORBIDITY AND BODY MASS INDEX (BMI) OF 40.0 TO 44.9 IN ADULT: Status: ACTIVE | Noted: 2022-01-24

## 2022-01-24 PROBLEM — E66.09 OBESITY DUE TO EXCESS CALORIES WITHOUT SERIOUS COMORBIDITY: Status: ACTIVE | Noted: 2022-01-24

## 2022-01-24 PROCEDURE — 99202 OFFICE O/P NEW SF 15 MIN: CPT

## 2022-01-24 PROCEDURE — 99205 OFFICE O/P NEW HI 60 MIN: CPT | Performed by: INTERNAL MEDICINE

## 2022-01-24 RX ORDER — TOPIRAMATE 25 MG/1
25 TABLET ORAL 2 TIMES DAILY
Qty: 120 TABLET | Refills: 1 | Status: SHIPPED
Start: 2022-01-24 | End: 2022-02-21

## 2022-01-24 NOTE — PATIENT INSTRUCTIONS
Rules:  · Count every calorie every day  · Limit sweets to one day per month  · Limit chips/crackers/pretzels/nuts/popcorn to 100 belkys/day  · Eliminate all sugar sweetened beverages (including fruit juice)  · Limit restaurants (including fast food and food from a convenience store) to one time every two weeks while in town    Requirements:  · Make sure protein intake is at least 60 grams per day (do not count protein every day; instead spot check your intake every 2-3 weeks and make sure what you think you are getting is close to accurate; consider using a protein shake if needed; these are in the pharmacy section of the stores, not the grocery section; Premier, Pure Protein and Fairlife are relatively inexpensive and taste good to most patients; other options are Nectar, Boost Max, Ensure Max, BeneProtein and GNC lean (which is lactose-free); Nectar fruit, Premier Protein Clear, IsoPure Protein Drink, and Protein 2 O are water-based options; Quest (or Cosco, which is cheaper and is ordered on [address]) and the Oh [employer] 1 protein bars can also be used, but have less protein in them )  (Disclaimer: Dietary supplements rarely have their listed ingredients and the amount of each verified by a third party other. Sometimes they give verification for their claims to be GMO and gluten free and to be organic. However, even such verifications as these may still be untrustworthy.)    · Make sure that fiber intake is at least 22 grams per day. Do this by either eating 12 tablespoons of the original, plain Fiber One cereal every day or 4 tablespoons of wheat dextrin powder (Benefiber or a generic brand) every day. Work up to this amount slowly by starting with only one-eighth to one-fourth of the target amount and then adding another one-eighth to one-fourth every one or two weeks until reaching the target.     · Take one multivitamin every day    Targets:  · Limit calorie intake to 1400 calories/day  · Walk 30 minutes daily, or equivalent  · Avoid eating 2 hours within bedtime. Tips:  · Do not eat outside of the dining room or the kitchen  · Do not eat while watching TV, videos, working on the computer or using a smart phone  · Do not eat food out of a multi-serving bag or container. · Establish 6 hours of food-free \"time-out\" periods (times you don't eat) each day. No period can be less than 1 hour long. The periods need to be the same every day for days that are the same (for example, workdays would have one set of food free periods and weekends would have another set of days). These six hours are in addition to the two hours before bedtime and the time spent sleeping. Topiramate:  Start Topiramate 25 mg. Take one tablet twice each day for one week. If the appetite suppression is insufficient, then increase to two tablets twice daily. Return to see me in 4 weeks.

## 2022-01-24 NOTE — PROGRESS NOTES
CC -   Weight gain, fatigue/tired    BACKGROUND -     First visit: 1/24/2022     Obesity (all weight in lbs)  Began more in the last 1 year  Initial BMI 44.49, Wt 245.2  HS Grad wt 130   Lowest   wt 160 (when first dxed with DM 20 years ago)   Highest  wt 245. 2  Pattern of wt gain: gradual and then rapidly in the last 1 yr  Wt change past yr: +15 lbs  Most wt lost: 45 lbs  Other diets attempted: tried some but could not stay with any    Desire to lose weight: 9/10  Problem posed by appetite: 6/10    Initial Diet:    Number of meals per day - 3    Number of snacks per day - 2-3    Meal volume - 12\" plate, occasional seconds    Fast food/convenience store - 2x/week    Restaurants (not fast food) - 0-1x/week   Sweets - 6d/week (muffin, cookie, candy if sugar goes low, jello)   Chips - 2-3d/week   Crackers/pretzels - 5d/week   Nuts - 1d/week   Peanut Butter - 0d/week   Popcorn - 0-1d/week   Dried fruit - 0d/week   Whole fruit - 5d/week (an apple a day, smt banana)   Breakfast cereal - 2d/week (honeybunches currently - whole milk)   Granola/Protein/Energy bar - 0-1d/week   Sugar sweetened beverages - orange juice when sugar is low, coffee 3 cups(2 tbp creamer - 70 Ced), tea with 1 tsp sugar and milk   Protein - No supplements   Fiber - No supplements     Exercise:    Gym membership - no    Walking - no    Running - no    Resistance - no    Aerobic class - no        Sleep: Bedtime: 10->11:30pm, wake up time: 5:30->6am - wakes up tired, daytime naps: no    Weight scale at home: yes, takes weight: <1/wk  Food scale: no  ______________________    [facility] [place] -  Past Medical History:   Diagnosis Date    Depression     DM type 1 (diabetes mellitus, type 1) (HCC)     On insulin Pump     Hyperlipidemia     Insulin pump in place     Vitamin D deficiency    Type 1 DM diagnosed 20 years ago       Hysterectomy 11/2020,     Prior to Admission medications    Medication Sig Start Date End Date Taking?  Authorizing Provider   DULoxetine (CYMBALTA) 60 MG extended release capsule TAKE 1 CAPSULE BY MOUTH DAILY 11/8/21   Historical Provider, MD   insulin aspart (NOVOLOG) 100 UNIT/ML injection vial USE VIA INSULIN PUMP. MAX  UNITS PER DAY 12/14/21   Evans Palacio MD   Continuous Blood Gluc  ([address]) TIKI To use with G6 sensor 9/25/20   Stacy Santiago MD   Continuous Blood Gluc Sensor (DEXCOM G6 SENSOR) MISC Change every 10 days 9/25/20   Stacy Santiago MD   Continuous Blood Gluc Transmit (DEXCOM G6 TRANSMITTER) MISC To use with sensors 9/25/20   Stacy Santiago MD   Continuous Blood Gluc Transmit (DEXCOM G6 TRANSMITTER) MISC To change every 3 months 9/1/20   Stacy Santiago MD   Continuous Blood Gluc Sensor (DEXCOM G6 SENSOR) MISC To change every 10 days 9/1/20   Stacy Santiago MD   Blood Glucose Monitoring Suppl (ONETOUCH VERIO FLEX SYSTEM) w/Device KIT Use to test blood sugar as directed 5/6/20   Stacy Santiago MD   blood glucose test strips (ONETOUCH VERIO) strip Use to test blood sugar 4 times a day 5/6/20   Stacy Santiago MD   blood glucose test strips (ONE TOUCH ULTRA TEST) strip 1 each by In Vitro route 4 times daily As needed. 5/1/20   Stacy Santiago MD   CONTOUR NEXT TEST strip Use to test blood sugar 4-6 times a day with medtronic insulin pump 3/11/20   Stacy Santiago MD   lisinopril (PRINIVIL;ZESTRIL) 5 MG tablet Take 5 mg by mouth daily    Historical Provider, MD   meclizine (ANTIVERT) 25 MG tablet Take 25 mg by mouth as needed  12/13/17   Historical Provider, MD   naproxen (NAPROSYN) 500 MG tablet Take 500 mg by mouth as needed  10/17/16   Historical Provider, MD   simvastatin (ZOCOR) 20 MG tablet Take 1 tablet by mouth nightly 1/3/19   Stacy Santiago MD   Lisinopril 10 mg daily. MVI daily. Allergies: Allergies   Allergen Reactions    Tramadol      Other reaction(s):  Other See Comments  Syncope       Family history: DM: no, Heart disease: grandfather(maternal), father (from smoking). Social history: smoking: never ; Alcohol: occasional , work: accounting, desk job. ROS -  Card - no CP, some SHARMA on climbing a flight of stairs. GI - no N/V/D/C    PE -  Gen : BP (!) 151/83 (Site: Left Upper Arm, Position: Sitting, Cuff Size: Thigh)   Pulse 73   Temp 97 °F (36.1 °C) (Temporal)   Ht 5' 2.25\" (1.581 m)   Wt 245 lb 3.2 oz (111.2 kg)   LMP 12/20/2018   BMI 44.49 kg/m²    (Forgot to take her lisinopril today)   WN, WD, NAD  Lung: Nml resp effort  Psych: Normal mood   Full affect  Neuro: Moves all ext well  ______________________    HISTORY & ASSESSMENT/PLAN -     Problem 1  - Fatigue   HPI   - Ongoing, gradually progressing which pt feels like due to weight gain, does feel tired suresh in pm, but no daytime naps  Assessment  - Uncontrolled   Plan   - May need sleep study if tiredness continues or gets worse. Weight reduction per plan below    Problem 2  - Obesity   HPI   - See above Background for description    Weight  Date    245.2  1/24/2022  DEN = 1986 Ced/d = 28695 Ced/wk  Wt effect of HR foods  =  300 Ced/d = 2100 Ced/wk = 31 lb/year. Assessment  - Uncontrolled  Plan   - Discussed various options. Not considering surgery currently. Not recommended VLCD, though we talked about it, but it can be dangerous with hypoglycemia. Recommended calorie count with low calorie diet. Patient does feel she may need medication for appetite suppression, after discussion of various medications, side effects and contraindications, planned for topiramate (HTN, had Wellbutrin in the past for depression that had not helped, h/o headaches). Diet plan detailed below in \"patient instructions\". Problem 3  - Hypertension   HPI   - Ongoing, she did tell me she forgot to take her lisinopril today (takes 10 mg daily) her repeat BP was 154/87. Assessment  - Uncontrolled.   Plan   - Recommended to take Lisinopril soon as she gets home, recommended BP monitoring, and medication may need to be adjusted if uncontrolled, with target BP =<130/80. Patient understands. Problem 4  - Dyslipidemia  HPI   - Has been on Simvastatin, tolerating medication, last LDL 80 (7/2021)  Assessment  - Controlled  Plan   - continue medications and monitor    Problem 5  - Type 1 DM on insulin pump  HPI  - diagnosed 20 years ago (at 26-35 yrs of age), diagnosed as type 1 per patient, on insulin pump  Assessment - controlled, hba1c has been in 6  Plan  - Management per Dr. Josh Francois, on insulin pump. I did talk to patient it is very likely that her blood glucose drops down with new diet regimen. It is ok to have higher BGL and then uptitrate insulin than having hypoglycemia which can be dangerous, can make her pass out and can even be fatal. She told me she can adjust the dose in her pump, and has continuous blood glucose monitor. Weight reduction was planned as follows:  Patient Instructions   Rules:  · Count every calorie every day  · Limit sweets to one day per month  · Limit chips/crackers/pretzels/nuts/popcorn to 100 belkys/day  · Eliminate all sugar sweetened beverages (including fruit juice)  · Limit restaurants (including fast food and food from a convenience store) to one time every two weeks while in town    Requirements:  · Make sure protein intake is at least 60 grams per day (do not count protein every day; instead spot check your intake every 2-3 weeks and make sure what you think you are getting is close to accurate; consider using a protein shake if needed; these are in the pharmacy section of the stores, not the grocery section; Premier, Pure Protein and Fairlife are relatively inexpensive and taste good to most patients; other options are Nectar, Boost Max, Ensure Max, BeneProtein and GNC lean (which is lactose-free);    Nectar fruit, Premier Protein Clear, IsoPure Protein Drink, and Protein 2 O are water-based options; Dheere Bolo (or Easy Food, which is cheaper and is ordered on [address]) and the Oh Yeah 1 protein bars can also be used, but have less protein in them )  (Disclaimer: Dietary supplements rarely have their listed ingredients and the amount of each verified by a third party other. Sometimes they give verification for their claims to be GMO and gluten free and to be organic. However, even such verifications as these may still be untrustworthy.)    · Make sure that fiber intake is at least 22 grams per day. Do this by either eating 12 tablespoons of the original, plain Fiber One cereal every day or 4 tablespoons of wheat dextrin powder (Benefiber or a generic brand) every day. Work up to this amount slowly by starting with only one-eighth to one-fourth of the target amount and then adding another one-eighth to one-fourth every one or two weeks until reaching the target. · Take one multivitamin every day    Targets:  · Limit calorie intake to 1400 calories/day  · Walk 30 minutes daily, or equivalent  · Avoid eating 2 hours within bedtime. Tips:  · Do not eat outside of the dining room or the kitchen  · Do not eat while watching TV, videos, working on the computer or using a smart phone  · Do not eat food out of a multi-serving bag or container. · Establish 6 hours of food-free \"time-out\" periods (times you don't eat) each day. No period can be less than 1 hour long. The periods need to be the same every day for days that are the same (for example, workdays would have one set of food free periods and weekends would have another set of days). These six hours are in addition to the two hours before bedtime and the time spent sleeping. Topiramate:  Start Topiramate 25 mg. Take one tablet twice each day for one week. If the appetite suppression is insufficient, then increase to two tablets twice daily. Return to see me in 4 weeks.     Orders Placed This Encounter   Medications    topiramate (TOPAMAX) 25 MG tablet     Sig: Take 1 tablet by mouth 2 times daily     Dispense:  120 tablet Refill:  1       Total time spent on encounter: 65 min, >50% of time was spent in counseling and/or coordination of care. Radha Colorado MD  Internal Medicine/Obesity Medicine  1/24/2022. PCP: Dr. Yonas Reyez ([facility]).

## 2022-02-21 ENCOUNTER — OFFICE VISIT (OUTPATIENT)
Dept: BARIATRICS/WEIGHT MGMT | Age: 52
End: 2022-02-21
Payer: COMMERCIAL

## 2022-02-21 VITALS
BODY MASS INDEX: 42.8 KG/M2 | HEIGHT: 62 IN | DIASTOLIC BLOOD PRESSURE: 74 MMHG | WEIGHT: 232.6 LBS | SYSTOLIC BLOOD PRESSURE: 142 MMHG | HEART RATE: 75 BPM | TEMPERATURE: 97.3 F

## 2022-02-21 DIAGNOSIS — E66.01 CLASS 3 SEVERE OBESITY DUE TO EXCESS CALORIES WITHOUT SERIOUS COMORBIDITY WITH BODY MASS INDEX (BMI) OF 40.0 TO 44.9 IN ADULT (HCC): ICD-10-CM

## 2022-02-21 DIAGNOSIS — E10.9 TYPE 1 DIABETES MELLITUS WITHOUT COMPLICATION (HCC): ICD-10-CM

## 2022-02-21 DIAGNOSIS — I10 ESSENTIAL HYPERTENSION: ICD-10-CM

## 2022-02-21 DIAGNOSIS — R53.82 CHRONIC FATIGUE: Primary | ICD-10-CM

## 2022-02-21 PROCEDURE — 99214 OFFICE O/P EST MOD 30 MIN: CPT | Performed by: INTERNAL MEDICINE

## 2022-02-21 PROCEDURE — 99211 OFF/OP EST MAY X REQ PHY/QHP: CPT

## 2022-02-21 RX ORDER — TOPIRAMATE 25 MG/1
25 TABLET ORAL 2 TIMES DAILY
Qty: 180 TABLET | Refills: 1 | Status: SHIPPED | OUTPATIENT
Start: 2022-02-21

## 2022-02-21 NOTE — PATIENT INSTRUCTIONS
Rules:  · Count every calorie every day  · Limit sweets to one day per month  · Limit chips/crackers/pretzels/nuts/popcorn to 100 ced/day  · Eliminate all sugar sweetened beverages (including fruit juice)  · Limit restaurants (including fast food and food from a convenience store) to one time every two weeks while in town    Requirements:  · Make sure protein intake is at least 65 grams per day (do not count protein every day; instead spot check your intake every 2-3 weeks and make sure what you think you are getting is close to accurate; consider using a protein shake if needed; these are in the pharmacy section of the stores, not the grocery section; Premier, Pure Protein and Fairlife are relatively inexpensive and taste good to most patients; other options are Nectar, Boost Max, Ensure Max, BeneProtein and GNC lean (which is lactose-free); Nectar fruit, Premier Protein Clear, IsoPure Protein Drink, and Protein 2 O are water-based options; Quest (or Cosco, which is cheaper and is ordered on [address]) and the Oh [employer] 1 protein bars can also be used, but have less protein in them )  (Disclaimer: Dietary supplements rarely have their listed ingredients and the amount of each verified by a third party other. Sometimes they give verification for their claims to be GMO and gluten free and to be organic. However, even such verifications as these may still be untrustworthy.) (<200 Ced, >25 g protein)    · Make sure that fiber intake is at least 22 grams per day. Do this by either eating 12 tablespoons of the original, plain Fiber One cereal every day or 4 tablespoons of wheat dextrin powder (Benefiber or a generic brand) every day. Work up to this amount slowly by starting with only one-eighth to one-fourth of the target amount and then adding another one-eighth to one-fourth every one or two weeks until reaching the target.   · Take one multivitamin every day    Targets:  · Limit calorie intake to 1200

## 2022-02-21 NOTE — PROGRESS NOTES
CC -   Follow up of: Weight gain, fatigue/tired    Denies tiredness, sleeping a lot better. Medication side effect: none. Exercise: has not started walking due to weather yet, but planning to. BGL has been fairly steady. BACKGROUND -     First visit: 1/24/2022     Obesity (all weight in lbs)  Began more in the last 1 year  Initial BMI 44.49, Wt 245.2  HS Grad wt 130   Lowest   wt 160 (when first dxed with DM 20 years ago)   Highest  wt 245. 2  Pattern of wt gain: gradual and then rapidly in the last 1 yr  Wt change past yr: +15 lbs  Most wt lost: 45 lbs  Other diets attempted: tried some but could not stay with any    Desire to lose weight: 9/10  Problem posed by appetite: 6/10    Initial Diet:    Number of meals per day - 3    Number of snacks per day - 2-3    Meal volume - 12\" plate, occasional seconds    Fast food/convenience store - 2x/week    Restaurants (not fast food) - 0-1x/week   Sweets - 6d/week (muffin, cookie, candy if sugar goes low, jello)   Chips - 2-3d/week   Crackers/pretzels - 5d/week   Nuts - 1d/week   Peanut Butter - 0d/week   Popcorn - 0-1d/week   Dried fruit - 0d/week   Whole fruit - 5d/week (an apple a day, smt banana)   Breakfast cereal - 2d/week (honeybunches currently - whole milk)   Granola/Protein/Energy bar - 0-1d/week   Sugar sweetened beverages - orange juice when sugar is low, coffee 3 cups(2 tbp creamer - 70 Ced), tea with 1 tsp sugar and milk   Protein - No supplements   Fiber - No supplements     Exercise:    Gym membership - no    Walking - no    Running - no    Resistance - no    Aerobic class - no        Sleep: Bedtime: 10->11:30pm, wake up time: 5:30->6am - wakes up tired, daytime naps: no    Weight scale at home: yes, takes weight: <1/wk  Food scale: no  ______________________    [facility] -  Past Medical History:   Diagnosis Date    Depression     DM type 1 (diabetes mellitus, type 1) ([facility])     On insulin Pump     Fatigue     Hyperlipidemia     Insulin pump in place  Obesity due to excess calories without serious comorbidity     Vitamin D deficiency    Type 1 DM diagnosed 20 years ago       Hysterectomy 11/2020,     Prior to Admission medications    Medication Sig Start Date End Date Taking? Authorizing Provider   topiramate (TOPAMAX) 25 MG tablet Take 1 tablet by mouth 2 times daily 1/24/22   Marilee Liang MD   DULoxetine (CYMBALTA) 60 MG extended release capsule TAKE 1 CAPSULE BY MOUTH DAILY 11/8/21   Historical Provider, MD   insulin aspart (NOVOLOG) 100 UNIT/ML injection vial USE VIA INSULIN PUMP. MAX  UNITS PER DAY 12/14/21   Evans Ridley MD   Continuous Blood Gluc  ([address]) TIKI To use with G6 sensor 9/25/20   Guzman Hence, MD   Continuous Blood Gluc Sensor (DEXCOM G6 SENSOR) MISC Change every 10 days 9/25/20   Guzman Hence, MD   Continuous Blood Gluc Transmit (DEXCOM G6 TRANSMITTER) MISC To use with sensors 9/25/20   Guzman Hence, MD   Continuous Blood Gluc Transmit (DEXCOM G6 TRANSMITTER) MISC To change every 3 months 9/1/20   Guzman Hence, MD   Continuous Blood Gluc Sensor (DEXCOM G6 SENSOR) MISC To change every 10 days 9/1/20   Guzman Hence, MD   Blood Glucose Monitoring Suppl (ONETOUCH VERIO FLEX SYSTEM) w/Device KIT Use to test blood sugar as directed 5/6/20   Guzman Hence, MD   blood glucose test strips (ONETOUCH VERIO) strip Use to test blood sugar 4 times a day 5/6/20   Guzman Hence, MD   blood glucose test strips (ONE TOUCH ULTRA TEST) strip 1 each by In Vitro route 4 times daily As needed.  5/1/20   Guzman Hence, MD   CONTOUR NEXT TEST strip Use to test blood sugar 4-6 times a day with medtronic insulin pump 3/11/20   Guzman Hence, MD   lisinopril (PRINIVIL;ZESTRIL) 5 MG tablet Take 5 mg by mouth daily    Historical Provider, MD   meclizine (ANTIVERT) 25 MG tablet Take 25 mg by mouth as needed  12/13/17   Historical Provider, MD   naproxen (NAPROSYN) 500 MG tablet Take 500 mg by mouth as needed  10/17/16   Historical Provider, MD   simvastatin (ZOCOR) 20 MG tablet Take 1 tablet by mouth nightly 1/3/19   Samantha Mancera MD   Lisinopril 10 mg daily. MVI daily. Allergies: Allergies   Allergen Reactions    Tramadol      Other reaction(s): Other See Comments  Syncope       Family history: DM: no, Heart disease: grandfather(maternal), father (from smoking). Social history: smoking: never ; Alcohol: occasional , work: accounting, desk job. ROS -  Card - no CP, some SHARMA on climbing a flight of stairs. GI - no N/V/D/C    PE -  Gen : BP (!) 142/74 (Site: Left Upper Arm, Position: Sitting, Cuff Size: Large Adult)   Pulse 75   Temp 97.3 °F (36.3 °C) (Temporal)   Ht 5' 2.25\" (1.581 m)   Wt 232 lb 9.6 oz (105.5 kg)   LMP 12/20/2018   BMI 42.20 kg/m²     WN, WD, NAD  Lung: Nml resp effort, CTA b/l  Heart: s1, s2 RRR, no M/R/G, no pedal edema noted  Psych: Normal mood   Full affect  Neuro: Moves all ext well  ______________________    HISTORY & ASSESSMENT/PLAN -     Problem 1  - Fatigue   HPI   - Doing much better, denies feeling tired currently. Feels weight reduction has helped  Assessment  - controlled, weight gain likely primary contributor. Plan   - Continue weight reduction per plan below    Problem 2  - Obesity   HPI   - See above Background for description    Weight  Date    245.2  1/24/22    232.6 lbs 2/21/22  Total weight change to date: 12.6  Average daily energy variance:   1/24/22 - 2/21/22: -12.6 lbs (33536 Ced)/28 days = -1575 Ced/day deficit. DEN = 1986 Ced/d    Assessment  - Uncontrolled ->improving. Plan   - She is doing well on low calorie diet with calorie counting (was not able to bring food log, and also dog chewed on prior AVS that included diet plan). She would like to go down to a plan of 1200 Ced/day. Feels Topamax is helping 9/10 appetite suppression at 25 mg bid, would like to continue that, no side effect.  List of low calorie non-starchy vegetables provided. Patient Instructions     Rules:  · Count every calorie every day  · Limit sweets to one day per month  · Limit chips/crackers/pretzels/nuts/popcorn to 100 ced/day  · Eliminate all sugar sweetened beverages (including fruit juice)  · Limit restaurants (including fast food and food from a convenience store) to one time every two weeks while in town    Requirements:  · Make sure protein intake is at least 65 grams per day (do not count protein every day; instead spot check your intake every 2-3 weeks and make sure what you think you are getting is close to accurate; consider using a protein shake if needed; these are in the pharmacy section of the stores, not the grocery section; Premier, Pure Protein and Fairlife are relatively inexpensive and taste good to most patients; other options are Nectar, Boost Max, Ensure Max, BeneProtein and GNC lean (which is lactose-free); Nectar fruit, Premier Protein Clear, IsoPure Protein Drink, and Protein 2 O are water-based options; Quest (or Cosco, which is cheaper and is ordered on [address] [facility] [address]) and the Oh GogoCoin 1 protein bars can also be used, but have less protein in them )  (Disclaimer: Dietary supplements rarely have their listed ingredients and the amount of each verified by a third party other. Sometimes they give verification for their claims to be GMO and gluten free and to be organic. However, even such verifications as these may still be untrustworthy.) (<200 Ced, >25 g protein)    · Make sure that fiber intake is at least 22 grams per day. Do this by either eating 12 tablespoons of the original, plain Fiber One cereal every day or 4 tablespoons of wheat dextrin powder (Benefiber or a generic brand) every day. Work up to this amount slowly by starting with only one-eighth to one-fourth of the target amount and then adding another one-eighth to one-fourth every one or two weeks until reaching the target.   · Take one multivitamin every day    Targets:  · Limit calorie intake to 1200 calories/day  · Walk 30 minutes daily  · Avoid eating 2 hours within bedtime. Tips:  · Do not eat outside of the dining room or the kitchen  · Do not eat while watching TV, videos, working on the computer or using a smart phone  · Do not eat food out of a multi-serving bag or container. · Establish 6 hours of food-free \"time-out\" periods (times you don't eat) each day. No period can be less than 1 hour long. The periods need to be the same every day for days that are the same (for example, workdays would have one set of food free periods and weekends would have another set of days). These six hours are in addition to the two hours before bedtime and the time spent sleeping. Follow up in 6-8 weeks. Problem 3  - Hypertension   HPI   - Ongoing, but seems better than prior, BP was 142/74 today. Assessment  - Uncontrolled. Plan   - continue current, watch BP, weight reduction can help, and she will follow up with her PCP. Problem 4  - Dyslipidemia  HPI   - Has been on Simvastatin, tolerating medication, last LDL 80 (7/2021)  Assessment  - Controlled  Plan   - continue medications and monitor. Problem 5  - Type 1 DM on insulin pump  HPI  - diagnosed 20 years ago (at 26-35 yrs of age), diagnosed as type 1 per patient, on insulin pump  Assessment - controlled, hba1c has been in 6  Plan  - Management per Dr. Porsha Aguilar, on insulin pump. Patient states her BGL has been steady, no hypoglycemic episodes on current 1400 Ced/day diet plan, but would be going to 1200 Ced/day diet plan and will continue to watch BGL. Orders Placed This Encounter   Medications    topiramate (TOPAMAX) 25 MG tablet     Sig: Take 1 tablet by mouth 2 times daily     Dispense:  180 tablet     Refill:  1       Total time spent on encounter: 31 min. Teressa Zavala MD  Internal Medicine/Obesity Medicine  2/21/2022. PCP: Dr. Delia Maciel ([facility]).

## 2022-04-25 ENCOUNTER — OFFICE VISIT (OUTPATIENT)
Dept: BARIATRICS/WEIGHT MGMT | Age: 52
End: 2022-04-25
Payer: COMMERCIAL

## 2022-04-25 VITALS
DIASTOLIC BLOOD PRESSURE: 68 MMHG | BODY MASS INDEX: 40.82 KG/M2 | WEIGHT: 221.8 LBS | HEART RATE: 77 BPM | HEIGHT: 62 IN | SYSTOLIC BLOOD PRESSURE: 142 MMHG | TEMPERATURE: 97.5 F

## 2022-04-25 DIAGNOSIS — I10 ESSENTIAL HYPERTENSION: ICD-10-CM

## 2022-04-25 DIAGNOSIS — E66.01 CLASS 3 SEVERE OBESITY DUE TO EXCESS CALORIES WITHOUT SERIOUS COMORBIDITY WITH BODY MASS INDEX (BMI) OF 40.0 TO 44.9 IN ADULT (HCC): ICD-10-CM

## 2022-04-25 DIAGNOSIS — E10.9 TYPE 1 DIABETES MELLITUS WITHOUT COMPLICATION (HCC): ICD-10-CM

## 2022-04-25 DIAGNOSIS — R53.82 CHRONIC FATIGUE: Primary | ICD-10-CM

## 2022-04-25 DIAGNOSIS — E78.5 HYPERLIPIDEMIA, UNSPECIFIED HYPERLIPIDEMIA TYPE: ICD-10-CM

## 2022-04-25 PROCEDURE — 99211 OFF/OP EST MAY X REQ PHY/QHP: CPT

## 2022-04-25 PROCEDURE — 99213 OFFICE O/P EST LOW 20 MIN: CPT | Performed by: INTERNAL MEDICINE

## 2022-05-17 ENCOUNTER — OFFICE VISIT (OUTPATIENT)
Dept: ENDOCRINOLOGY | Age: 52
End: 2022-05-17
Payer: COMMERCIAL

## 2022-05-17 VITALS
DIASTOLIC BLOOD PRESSURE: 84 MMHG | BODY MASS INDEX: 40.15 KG/M2 | HEART RATE: 75 BPM | OXYGEN SATURATION: 100 % | WEIGHT: 218.2 LBS | RESPIRATION RATE: 16 BRPM | SYSTOLIC BLOOD PRESSURE: 131 MMHG | HEIGHT: 62 IN

## 2022-05-17 DIAGNOSIS — E10.9 TYPE 1 DIABETES MELLITUS WITHOUT COMPLICATION (HCC): ICD-10-CM

## 2022-05-17 DIAGNOSIS — E55.9 VITAMIN D DEFICIENCY: Primary | ICD-10-CM

## 2022-05-17 DIAGNOSIS — E78.5 HYPERLIPIDEMIA, UNSPECIFIED HYPERLIPIDEMIA TYPE: ICD-10-CM

## 2022-05-17 PROCEDURE — 99214 OFFICE O/P EST MOD 30 MIN: CPT | Performed by: INTERNAL MEDICINE

## 2022-05-17 NOTE — PROGRESS NOTES
[address] [place] [address] Department of Endocrinology Diabetes and Metabolism   [address] [facility] [address]   Phone: [phone]  Fax: [phone]    Date of Service: 5/17/22  Primary Care Physician: Aubrey Dave DO  Provider: Jacki Chan MD     Reason for the visit:  Type I DM on insulin Pump, vitD deficiency, Hyperlipidemia     History of Present Illness: The history is provided by the patient. No  was used. Accuracy of the patient data is excellent. Stella Tabares is a very pleasant 47 y.o. female seen today for follow up visit     Stella Tabares was diagnosed with diabetes in 2001 and has been on insulin Pump for many years   Currently on T-slim pump + DEXCOM CGM   Current settings:  Basal Insulin:12a 0.625, 5:30a 0.625, 12p 0.750, 10p 0.70, CR 12a 13, 12p 12, 10p 13, ISF 85, Target: 110, Active Insulin: 3;30 hours  Continues to have skin reaction with tape from sensors, has tried a number of suggestions without benefit  Lab Results   Component Value Date    LABA1C 6.4 07/17/2021    LABA1C 6.7 03/16/2021    LABA1C 6.9 05/04/2020    LABA1C 6.8 10/16/2019   no hypoglycemia   Activity level: always busy with Family and work  Lifestyle modification: Diet  Patient is up to date with eye exam and denied any h/o diabetic retinopathy   Podiatry Exam:self care   Microvascular complications: no nephropathy, retinopathy, or neuropathy  Macrovascular complications: no CAD, PVD, or CVA    2. Hyperlipidemia - on statin. PAST MEDICAL HISTORY   Past Medical History:   Diagnosis Date    Depression     DM type 1 (diabetes mellitus, type 1) (HCC)     On insulin Pump     Fatigue     Hyperlipidemia     Insulin pump in place     Obesity due to excess calories without serious comorbidity     Vitamin D deficiency      PAST SURGICAL HISTORY   No past surgical history on file. SOCIAL HISTORY   Tobacco:   reports that she has never smoked.  She has never used smokeless tobacco.  Alcol:   reports current alcohol use. Illicit Drugs:   reports no history of drug use. FAMILY HISTORY   Family History   Problem Relation Age of Onset    COPD Father     Heart Disease Father     Diabetes Paternal Grandmother     Heart Disease Paternal Grandfather      ALLERGIES AND DRUG REACTIONS   Allergies   Allergen Reactions    Tramadol      Other reaction(s): Other See Comments  Syncope       CURRENT MEDICATIONS     Current Outpatient Medications   Medication Sig Dispense Refill    topiramate (TOPAMAX) 25 MG tablet Take 1 tablet by mouth 2 times daily 180 tablet 1    DULoxetine (CYMBALTA) 60 MG extended release capsule TAKE 1 CAPSULE BY MOUTH DAILY      insulin aspart (NOVOLOG) 100 UNIT/ML injection vial USE VIA INSULIN PUMP. MAX  UNITS PER DAY 30 mL 3    Continuous Blood Gluc  (DEXCOM G6 ) TIKI To use with G6 sensor 1 Device 0    Continuous Blood Gluc Sensor (DEXCOM G6 SENSOR) MISC Change every 10 days 9 each 3    Continuous Blood Gluc Transmit (DEXCOM G6 TRANSMITTER) MISC To use with sensors 1 each 0    Continuous Blood Gluc Transmit (DEXCOM G6 TRANSMITTER) MISC To change every 3 months 1 each 3    Continuous Blood Gluc Sensor (DEXCOM G6 SENSOR) MISC To change every 10 days 3 each 11    Blood Glucose Monitoring Suppl (ONETOUCH VERIO FLEX SYSTEM) w/Device KIT Use to test blood sugar as directed 1 kit 1    blood glucose test strips (ONETOUCH VERIO) strip Use to test blood sugar 4 times a day 200 each 5    blood glucose test strips (ONE TOUCH ULTRA TEST) strip 1 each by In Vitro route 4 times daily As needed.  150 each 5    CONTOUR NEXT TEST strip Use to test blood sugar 4-6 times a day with medtronic insulin pump 200 each 5    lisinopril (PRINIVIL;ZESTRIL) 5 MG tablet Take 5 mg by mouth daily      meclizine (ANTIVERT) 25 MG tablet Take 25 mg by mouth as needed       naproxen (NAPROSYN) 500 MG tablet Take 500 mg by mouth as needed       simvastatin (ZOCOR) 20 MG tablet Take 1 tablet by mouth nightly 90 tablet 1     No current facility-administered medications for this visit. Review of Systems  Constitutional: No fever, no chills, no diaphoresis, no generalized weakness. HEENT: No blurred vision, No sore throat, no ear pain, no hair loss  Neck: denied any neck swelling, difficulty swallowing,   Cardio-pulmonary: No CP, SOB or palpitation, No orthopnea or PND. No cough or wheezing. GI: No N/V/D, no constipation, No abdominal pain, no melena or hematochezia   : Denied any dysuria, hematuria, flank pain, discharge, or incontinence. Skin: denied any rash, ulcer, Hirsute, or hyperpigmentation. MSK: denied any joint deformity, joint pain/swelling, muscle pain, or back pain. Neuro: no numbness, no tingling, no weakness,  OBJECTIVE    /84   Pulse 75   Resp 16   Ht 5' 2\" (1.575 m)   Wt 218 lb 3.2 oz (99 kg)   LMP 12/20/2018   SpO2 100%   BMI 39.91 kg/m²   BP Readings from Last 4 Encounters:   05/17/22 131/84   04/25/22 (!) 142/68   02/21/22 (!) 142/74   01/24/22 (!) 151/83     Wt Readings from Last 6 Encounters:   05/17/22 218 lb 3.2 oz (99 kg)   04/25/22 221 lb 12.8 oz (100.6 kg)   02/21/22 232 lb 9.6 oz (105.5 kg)   01/24/22 245 lb 3.2 oz (111.2 kg)   12/14/21 249 lb (112.9 kg)   07/28/21 243 lb 12.8 oz (110.6 kg)     Physical examination:  General: awake alert, oriented x3, no abnormal position or movements. HEENT: normocephalic non-traumatic, no exophthalmos   Neck: supple, no LN enlargement, no thyromegaly, no thyroid tenderness, no JVD. Pulm: Clear equal air entry no added sounds, no wheezing or rhonchi    CVS: S1 + S2, no murmur, no heave. Dorsalis pedis pulse palpable   Abd: soft lax, no tenderness, no organomegaly, audible bowel sounds. Skin: warm, no lesions, no rash. No callus, no Ulcers, No acanthosis nigricans.  Insulin pump insertion site looks normal    Neuro: CN intact, Monofilament sensation arian bilateral , muscle power normal  Psych: normal mood, and affect    Review of Laboratory Data:  I have reviewed the following:VITAMIN D 25-OH 6/8/2019  Component Name Value   Vitamin D, 25-OH,D2 45.2     No results found for: WBC, RBC, HGB, HCT, MCV, MCH, MCHC, RDW, PLT, MPV, GRANULOCYTES, DUSTIN, BANDS   Lab Results   Component Value Date/Time     07/17/2021 12:00 AM    K 4.1 07/17/2021 12:00 AM    CO2 28.0 07/17/2021 12:00 AM    CALCIUM 8.7 07/17/2021 12:00 AM      No results found for: TSH, T4FREE, I1WCWAK, FT3, Q4QNBOR, TSI, TPOABS, THGAB  Lab Results   Component Value Date    LABA1C 6.4 07/17/2021    GLUCOSE 174 07/17/2021     Lab Results   Component Value Date    CHOL 168 07/17/2021    TRIG 59 07/17/2021    HDL 76 07/17/2021     Lab Results   Component Value Date    VITD25 38 07/17/2021        ASSESSMENT & RECOMMENDATIONS   Tracey Ryder, a 47 y.o.-old female seen in for the following issues     Diabetes Mellitus Type 1   · Under good control, A1c 6.9%   · I have reviewed both pump anf CGM download, willslightyl adjust pump settings to: Basal Insulin:12a 0.625, 5:30a 0.625, 12p 0.750, 10p 0.70, CR 12a 13, 12p 11, 10p 13, ISF 85, Target: 110, Active Insulin: 3;30 hours  · Patient up to date with the routine diabetes maintenance and prevention     Continuous Glucose Monitoring (CGM) download and interpretation    I personally reviewed and interpreted continuous glucose monitor (CGM) download. CGM report was discussed with patient including blood glucose patterns, percentages of blood glucose at goal, above goal and below goal. Insulin dosages/antidiabetic regimen was adjusted according to CGM download. Full CGM was scanned under media.      Hypoglycemia  · Improved   · Continue using Pump and CGM     Hyperlipidemia  · Continue Zocor      Low vitamin D  · Continue vitD supplement    Morbid obesity  · Seen at [facility]     I personally reviewed external notes from PCP and other patient's care team providers, and personally interpreted labs associated with the above diagnosis. I also ordered labs to further assess and manage the above addressed medical conditions    Return in about 4 months (around 9/17/2022) for DM type 1, VitD deficiency. The above issues were reviewed with the patient who understood and agreed with the plan  Thank you for allowing us to participate in the care of this patient. Please do not hesitate to contact us with any additional questions. Diagnosis Orders   1. Vitamin D deficiency     2. Type 1 diabetes mellitus without complication (HCC)  Hemoglobin A1C   3. Hyperlipidemia, unspecified hyperlipidemia type       Sahra Hess MD  Endocrinologist, [facility])   [address] [place] [address], [address] [place] [address],[facility] [id number]   Phone: [phone]  Fax: [phone]  --------------------------------------------  An electronic signature was used to authenticate this note.  Alonso Gramajo MD on 5/17/2022 at 8:44 AM

## 2022-08-15 ENCOUNTER — TELEPHONE (OUTPATIENT)
Dept: BARIATRICS/WEIGHT MGMT | Age: 52
End: 2022-08-15

## 2022-08-15 NOTE — TELEPHONE ENCOUNTER
patient called left message on facility vm called patient back to see if would like to reschedule patient advised not at this time. advised patient to call facility if would like to.

## 2022-09-08 ENCOUNTER — OFFICE VISIT (OUTPATIENT)
Dept: ENDOCRINOLOGY | Age: 52
End: 2022-09-08
Payer: COMMERCIAL

## 2022-09-08 VITALS
DIASTOLIC BLOOD PRESSURE: 81 MMHG | SYSTOLIC BLOOD PRESSURE: 131 MMHG | BODY MASS INDEX: 40.06 KG/M2 | HEART RATE: 66 BPM | WEIGHT: 219 LBS

## 2022-09-08 DIAGNOSIS — E66.09 CLASS 2 OBESITY DUE TO EXCESS CALORIES WITHOUT SERIOUS COMORBIDITY WITH BODY MASS INDEX (BMI) OF 39.0 TO 39.9 IN ADULT: ICD-10-CM

## 2022-09-08 DIAGNOSIS — E10.9 TYPE 1 DIABETES MELLITUS WITHOUT COMPLICATION (HCC): Primary | ICD-10-CM

## 2022-09-08 DIAGNOSIS — E78.5 HYPERLIPIDEMIA, UNSPECIFIED HYPERLIPIDEMIA TYPE: ICD-10-CM

## 2022-09-08 DIAGNOSIS — Z96.41 INSULIN PUMP IN PLACE: ICD-10-CM

## 2022-09-08 DIAGNOSIS — E55.9 VITAMIN D DEFICIENCY: ICD-10-CM

## 2022-09-08 LAB — HBA1C MFR BLD: 6.9 %

## 2022-09-08 PROCEDURE — 99214 OFFICE O/P EST MOD 30 MIN: CPT | Performed by: NURSE PRACTITIONER

## 2022-09-08 PROCEDURE — 83036 HEMOGLOBIN GLYCOSYLATED A1C: CPT | Performed by: NURSE PRACTITIONER

## 2022-09-08 PROCEDURE — 3044F HG A1C LEVEL LT 7.0%: CPT | Performed by: NURSE PRACTITIONER

## 2022-09-08 NOTE — PROGRESS NOTES
[address] [place] [address] Department of Endocrinology Diabetes and Metabolism   [address] [facility] [address]   Phone: [phone]  Fax: [phone]    Date of Service: 9/8/22  Primary Care Physician: Bianca Le DO  Provider: TETO Salazar NP      Reason for the visit:  Type I DM on insulin Pump    History of Present Illness: The history is provided by the patient. No  was used. Accuracy of the patient data is excellent. Rodrigue Dorsey is a very pleasant 47 y.o. female seen today for follow up visit     Rodrigue Dorsey was diagnosed with diabetes in 2001 and has been on insulin Pump for many years     Currently on T-slim pump + DEXCOM CGM     Current settings:  Basal Insulin:12a 0.625, 5:30a 0.625, 12p 0.750, 10p 0.70, CR 12a 13, 530 am 13, 12p 11, 10p 13, ISF 85, Target: 110, Active Insulin: 3:30 hours    AVG   TIR 72%      Lab Results   Component Value Date/Time    LABA1C 6.9 09/08/2022 08:37 AM    LABA1C 6.4 07/17/2021 12:00 AM    LABA1C 6.7 03/16/2021 12:46 PM    LABA1C 6.9 05/04/2020 12:00 AM     no hypoglycemia   Activity level: always busy with Family and work  Lifestyle modification: Diet, Is mindful of a diabetic diet  Patient is up to date with eye exam and denied any h/o diabetic retinopathy   Podiatry Exam:self care   Microvascular complications: no nephropathy, retinopathy, or neuropathy  Macrovascular complications: no CAD, PVD, or CVA      PAST MEDICAL HISTORY   Past Medical History:   Diagnosis Date    Depression     DM type 1 (diabetes mellitus, type 1) (HCC)     On insulin Pump     Fatigue     Hyperlipidemia     Insulin pump in place     Obesity due to excess calories without serious comorbidity     Vitamin D deficiency      PAST SURGICAL HISTORY   No past surgical history on file. SOCIAL HISTORY   Tobacco:   reports that she has never smoked. She has never used smokeless tobacco.  Alcol:   reports current alcohol use.   Illicit Drugs:   reports no history of drug use. FAMILY HISTORY   Family History   Problem Relation Age of Onset    COPD Father     Heart Disease Father     Diabetes Paternal Grandmother     Heart Disease Paternal Grandfather      ALLERGIES AND DRUG REACTIONS   Allergies   Allergen Reactions    Tramadol      Other reaction(s): Other See Comments  Syncope       CURRENT MEDICATIONS     Current Outpatient Medications   Medication Sig Dispense Refill    insulin aspart (NOVOLOG) 100 UNIT/ML injection vial USE VIA INSULIN PUMP. MAX  UNITS PER DAY 30 mL 3    Continuous Blood Gluc  (DEXCOM G6 ) TIKI To use with G6 sensor 1 Device 0    Continuous Blood Gluc Sensor (DEXCOM G6 SENSOR) MISC Change every 10 days 9 each 3    Continuous Blood Gluc Transmit (DEXCOM G6 TRANSMITTER) MISC To use with sensors 1 each 0    Continuous Blood Gluc Transmit (DEXCOM G6 TRANSMITTER) MISC To change every 3 months 1 each 3    Continuous Blood Gluc Sensor (DEXCOM G6 SENSOR) MISC To change every 10 days 3 each 11    topiramate (TOPAMAX) 25 MG tablet Take 1 tablet by mouth 2 times daily 180 tablet 1    DULoxetine (CYMBALTA) 60 MG extended release capsule TAKE 1 CAPSULE BY MOUTH DAILY      Blood Glucose Monitoring Suppl (ONETOUCH VERIO FLEX SYSTEM) w/Device KIT Use to test blood sugar as directed 1 kit 1    blood glucose test strips (ONETOUCH VERIO) strip Use to test blood sugar 4 times a day 200 each 5    blood glucose test strips (ONE TOUCH ULTRA TEST) strip 1 each by In Vitro route 4 times daily As needed.  150 each 5    CONTOUR NEXT TEST strip Use to test blood sugar 4-6 times a day with medtronic insulin pump 200 each 5    lisinopril (PRINIVIL;ZESTRIL) 5 MG tablet Take 5 mg by mouth daily      meclizine (ANTIVERT) 25 MG tablet Take 25 mg by mouth as needed       naproxen (NAPROSYN) 500 MG tablet Take 500 mg by mouth as needed       simvastatin (ZOCOR) 20 MG tablet Take 1 tablet by mouth nightly 90 tablet 1     No current facility-administered medications for this visit. Review of Systems  Constitutional: No fever, no chills, no diaphoresis, no generalized weakness. HEENT: No blurred vision, No sore throat, no ear pain, no hair loss  Neck: denied any neck swelling, difficulty swallowing,   Cardio-pulmonary: No CP, SOB or palpitation, No orthopnea or PND. No cough or wheezing. GI: No N/V/D, no constipation, No abdominal pain, no melena or hematochezia   : Denied any dysuria, hematuria, flank pain, discharge, or incontinence. Skin: denied any rash, ulcer, Hirsute, or hyperpigmentation. MSK: denied any joint deformity, joint pain/swelling, muscle pain, or back pain. Neuro: no numbness, no tingling, no weakness    OBJECTIVE    /81   Pulse 66   Wt 219 lb (99.3 kg)   LMP 12/20/2018   BMI 40.06 kg/m²   BP Readings from Last 4 Encounters:   09/08/22 131/81   05/17/22 131/84   04/25/22 (!) 142/68   02/21/22 (!) 142/74     Wt Readings from Last 6 Encounters:   09/08/22 219 lb (99.3 kg)   05/17/22 218 lb 3.2 oz (99 kg)   04/25/22 221 lb 12.8 oz (100.6 kg)   02/21/22 232 lb 9.6 oz (105.5 kg)   01/24/22 245 lb 3.2 oz (111.2 kg)   12/14/21 249 lb (112.9 kg)     Physical examination:  General: awake alert, oriented x3, no abnormal position or movements. HEENT: normocephalic non-traumatic, no exophthalmos   Neck: supple, no LN enlargement, no thyromegaly, no thyroid tenderness, no JVD. Pulm: Clear equal air entry no added sounds, no wheezing or rhonchi    CVS: S1 + S2, no murmur, no heave. Dorsalis pedis pulse palpable   Abd: soft lax, no tenderness, no organomegaly, audible bowel sounds. Skin: warm, no lesions, no rash. No callus, no Ulcers, No acanthosis nigricans.  Insulin pump insertion site looks normal    Neuro: CN intact, sensation arian bilateral , muscle power normal  Psych: normal mood, and affect    Review of Laboratory Data:  I have reviewed the following:VITAMIN D 25-OH 6/8/2019  Component Name Value Vitamin D, 25-OH,D2 45.2     No results found for: WBC, RBC, HGB, HCT, MCV, MCH, MCHC, RDW, PLT, MPV, GRANULOCYTES, DUSTIN, BANDS   Lab Results   Component Value Date/Time     07/17/2021 12:00 AM    K 4.1 07/17/2021 12:00 AM    CO2 28.0 07/17/2021 12:00 AM    CALCIUM 8.7 07/17/2021 12:00 AM      No results found for: TSH, T4FREE, H7LRMFH, FT3, I4UPHPK, TSI, TPOABS, THGAB  Lab Results   Component Value Date/Time    LABA1C 6.9 09/08/2022 08:37 AM    GLUCOSE 174 07/17/2021 12:00 AM     Lab Results   Component Value Date/Time    CHOL 168 07/17/2021 12:00 AM    TRIG 59 07/17/2021 12:00 AM    HDL 76 07/17/2021 12:00 AM     Lab Results   Component Value Date/Time    VITD25 38 07/17/2021 12:00 AM        ASSESSMENT & RECOMMENDATIONS   Tucker Castro, a 47 y.o.-old female seen in for the following issues     Diabetes Mellitus Type 1   Under good control, A1c 6.9%   I have reviewed both pump anf CGM download, jeronimoyl adjust pump settings to:   Basal Insulin:12a 0.625, 5:30a 0.625, 12p 0.750, 10p 0.70, CR 12a 12, 530 am 12, 12p 10, 12p 13, ISF 75, Target: 110, Active Insulin: 3:30 hours  Patient up to date with the routine diabetes maintenance and prevention   Labs in Care Everywhere  A1c at next OV    Continuous Glucose Monitoring (CGM) download and interpretation   I personally reviewed and interpreted continuous glucose monitor (CGM) download. CGM report was discussed with patient including blood glucose patterns, percentages of blood glucose at goal, above goal and below goal. Insulin dosages/antidiabetic regimen was adjusted according to CGM download. Full CGM was scanned under media.      Hyperlipidemia  Continue Zocor    Last lipid levels at goal     Low vitamin D  Continue Vit D supplement  Last Vit D 47 on 5/7/22    Morbid obesity  No longer following with Bariatric  surgery     I personally reviewed external notes from PCP and other patient's care team providers, and personally interpreted labs associated with the above diagnosis. I also ordered labs to further assess and manage the above addressed medical conditions    Return in about 4 months (around 1/8/2023) for Type 2 DM. The above issues were reviewed with the patient who understood and agreed with the plan  Thank you for allowing us to participate in the care of this patient. Please do not hesitate to contact us with any additional questions. Diagnosis Orders   1. Type 1 diabetes mellitus without complication (HCC)  POCT glycosylated hemoglobin (Hb A1C)      2. Insulin pump in place        3. Hyperlipidemia, unspecified hyperlipidemia type        4. Vitamin D deficiency        5. Class 2 obesity due to excess calories without serious comorbidity with body mass index (BMI) of 39.0 to 39.9 in adult            TETO Carson NP    [facility])   [address] [place] [address], [address] [place] [address],[facility] [address]   Phone: [phone]  Fax: [phone]  --------------------------------------------  An electronic signature was used to authenticate this note.  TETO Carson NP on 9/8/2022 at 8:52 AM

## 2022-10-09 DIAGNOSIS — E10.9 TYPE 1 DIABETES MELLITUS WITHOUT COMPLICATION (HCC): ICD-10-CM

## 2022-10-19 RX ORDER — INSULIN ASPART 100 [IU]/ML
INJECTION, SOLUTION INTRAVENOUS; SUBCUTANEOUS
Qty: 30 ML | Refills: 6 | Status: SHIPPED | OUTPATIENT
Start: 2022-10-19

## 2023-01-27 DIAGNOSIS — E10.9 TYPE 1 DIABETES MELLITUS WITHOUT COMPLICATION (HCC): Primary | ICD-10-CM

## 2023-02-15 ENCOUNTER — OFFICE VISIT (OUTPATIENT)
Dept: ENDOCRINOLOGY | Age: 53
End: 2023-02-15

## 2023-02-15 VITALS
HEIGHT: 62 IN | SYSTOLIC BLOOD PRESSURE: 137 MMHG | WEIGHT: 231 LBS | HEART RATE: 94 BPM | DIASTOLIC BLOOD PRESSURE: 79 MMHG | BODY MASS INDEX: 42.51 KG/M2

## 2023-02-15 DIAGNOSIS — E10.9 TYPE 1 DIABETES MELLITUS WITHOUT COMPLICATION (HCC): Primary | ICD-10-CM

## 2023-02-15 DIAGNOSIS — E66.01 CLASS 3 SEVERE OBESITY DUE TO EXCESS CALORIES WITHOUT SERIOUS COMORBIDITY WITH BODY MASS INDEX (BMI) OF 40.0 TO 44.9 IN ADULT (HCC): ICD-10-CM

## 2023-02-15 DIAGNOSIS — E55.9 VITAMIN D DEFICIENCY: ICD-10-CM

## 2023-02-15 DIAGNOSIS — E78.5 HYPERLIPIDEMIA, UNSPECIFIED HYPERLIPIDEMIA TYPE: ICD-10-CM

## 2023-02-15 DIAGNOSIS — Z96.41 INSULIN PUMP IN PLACE: ICD-10-CM

## 2023-02-15 LAB — HBA1C MFR BLD: 6.3 %

## 2023-02-15 NOTE — PROGRESS NOTES
[address] [place] [address] Department of Endocrinology Diabetes and Metabolism   [address] [facility] [address]   Phone: [phone]  Fax: [phone]    Date of Service: 2/15/23  Primary Care Physician: Lemon Olszewski DO  Provider: Osker Canavan, APRN - NP      Reason for the visit:  Type I DM on insulin Pump    History of Present Illness: The history is provided by the patient. No  was used. Accuracy of the patient data is excellent. Helena Isaacs is a very pleasant 47 y.o. female seen today for follow up visit     Helena Isaacs was diagnosed with diabetes in 2001 and has been on insulin Pump for many years     Currently on T-slim pump + DEXCOM CGM     Current settings:  Basal Insulin:12a 0.625, 5:30a 0.625, 12p 0.750, 10p 0.70, CR 12a 12, 530 am 12, 12p 10, 10p 12, ISF 85, Target: 110, Active Insulin: 3:30 hours    AVG   TIR 63%  Hyperglycemia  36%  Lows < 1%    TDD  46.6      Lab Results   Component Value Date/Time    LABA1C 6.3 02/15/2023 12:31 PM    LABA1C 6.9 09/08/2022 08:37 AM    LABA1C 6.4 07/17/2021 12:00 AM    LABA1C 6.7 03/16/2021 12:46 PM     Infrequent  hypoglycemia   Activity level: always busy with Family and work  Lifestyle modification: Diet, Is mindful of a diabetic diet  Patient is up to date with eye exam and denied any h/o diabetic retinopathy   Podiatry Exam:self care   Microvascular complications: no nephropathy, retinopathy, or neuropathy  Macrovascular complications: no CAD, PVD, or CVA      PAST MEDICAL HISTORY   Past Medical History:   Diagnosis Date    Depression     DM type 1 (diabetes mellitus, type 1) (HCC)     On insulin Pump     Fatigue     Hyperlipidemia     Insulin pump in place     Obesity due to excess calories without serious comorbidity     Vitamin D deficiency      PAST SURGICAL HISTORY   No past surgical history on file. SOCIAL HISTORY   Tobacco:   reports that she has never smoked.  She has never used smokeless tobacco.  Alcol:   reports current alcohol use. Illicit Drugs:   reports no history of drug use. FAMILY HISTORY   Family History   Problem Relation Age of Onset    COPD Father     Heart Disease Father     Diabetes Paternal Grandmother     Heart Disease Paternal Grandfather      ALLERGIES AND DRUG REACTIONS   Allergies   Allergen Reactions    Tramadol      Other reaction(s): Other See Comments  Syncope       CURRENT MEDICATIONS     Current Outpatient Medications   Medication Sig Dispense Refill    insulin lispro (HUMALOG) 100 UNIT/ML injection vial Use via insulin pump. Max 100 units per day 30 mL 3    Continuous Blood Gluc  (DEXCOM G6 ) TIKI To use with G6 sensor 1 Device 0    Continuous Blood Gluc Sensor (DEXCOM G6 SENSOR) MISC Change every 10 days 9 each 3    Continuous Blood Gluc Transmit (DEXCOM G6 TRANSMITTER) MISC To change every 3 months 1 each 3    Blood Glucose Monitoring Suppl (ONETOUCH VERIO FLEX SYSTEM) w/Device KIT Use to test blood sugar as directed 1 kit 1    blood glucose test strips (ONETOUCH VERIO) strip Use to test blood sugar 4 times a day 200 each 5    blood glucose test strips (ONE TOUCH ULTRA TEST) strip 1 each by In Vitro route 4 times daily As needed. 150 each 5    topiramate (TOPAMAX) 25 MG tablet Take 1 tablet by mouth 2 times daily 180 tablet 1    DULoxetine (CYMBALTA) 60 MG extended release capsule TAKE 1 CAPSULE BY MOUTH DAILY      CONTOUR NEXT TEST strip Use to test blood sugar 4-6 times a day with medtronic insulin pump 200 each 5    lisinopril (PRINIVIL;ZESTRIL) 5 MG tablet Take 5 mg by mouth daily      meclizine (ANTIVERT) 25 MG tablet Take 25 mg by mouth as needed       naproxen (NAPROSYN) 500 MG tablet Take 500 mg by mouth as needed       simvastatin (ZOCOR) 20 MG tablet Take 1 tablet by mouth nightly 90 tablet 1     No current facility-administered medications for this visit.        Review of Systems  Constitutional: No fever, no chills, no diaphoresis, no generalized weakness. HEENT: No blurred vision, No sore throat, no ear pain, no hair loss  Neck: denied any neck swelling, difficulty swallowing,   Cardio-pulmonary: No CP, SOB or palpitation, No orthopnea or PND. No cough or wheezing. GI: No N/V/D, no constipation, No abdominal pain, no melena or hematochezia   : Denied any dysuria, hematuria, flank pain, discharge, or incontinence. Skin: denied any rash, ulcer, Hirsute, or hyperpigmentation. MSK: denied any joint deformity, joint pain/swelling, muscle pain, or back pain. Neuro: no numbness, no tingling, no weakness    OBJECTIVE    /79   Pulse 94   Ht 5' 2\" (1.575 m)   Wt 231 lb (104.8 kg)   LMP 12/20/2018   BMI 42.25 kg/m²   BP Readings from Last 4 Encounters:   02/15/23 137/79   09/08/22 131/81   05/17/22 131/84   04/25/22 (!) 142/68     Wt Readings from Last 6 Encounters:   02/15/23 231 lb (104.8 kg)   09/08/22 219 lb (99.3 kg)   05/17/22 218 lb 3.2 oz (99 kg)   04/25/22 221 lb 12.8 oz (100.6 kg)   02/21/22 232 lb 9.6 oz (105.5 kg)   01/24/22 245 lb 3.2 oz (111.2 kg)     Physical examination:  General: awake alert, oriented x3, no abnormal position or movements. HEENT: normocephalic non-traumatic, no exophthalmos   Neck: supple, no LN enlargement, no thyromegaly, no thyroid tenderness, no JVD. Pulm: Clear equal air entry no added sounds, no wheezing or rhonchi    CVS: S1 + S2, no murmur, no heave. Dorsalis pedis pulse palpable   Abd: soft lax, no tenderness, no organomegaly, audible bowel sounds. Skin: warm, no lesions, no rash. No callus, no Ulcers, No acanthosis nigricans.  Insulin pump insertion site looks normal    Neuro: CN intact, sensation arian bilateral , muscle power normal  Psych: normal mood, and affect    Review of Laboratory Data:  I have reviewed the following:VITAMIN D 25-OH 6/8/2019  Component Name Value   Vitamin D, 25-OH,D2 45.2     No results found for: WBC, RBC, HGB, HCT, MCV, MCH, MCHC, RDW, PLT, MPV, GRANULOCYTES, DUSTIN, NEUT, BANDS   Lab Results   Component Value Date/Time     07/17/2021 12:00 AM    K 4.1 07/17/2021 12:00 AM    CO2 28.0 07/17/2021 12:00 AM    CALCIUM 8.7 07/17/2021 12:00 AM      No results found for: TSH, T4FREE, U8JTSRF, FT3, B2VIIDC, TSI, TPOABS, THGAB  Lab Results   Component Value Date/Time    LABA1C 6.3 02/15/2023 12:31 PM    GLUCOSE 174 07/17/2021 12:00 AM     Lab Results   Component Value Date/Time    CHOL 168 07/17/2021 12:00 AM    TRIG 59 07/17/2021 12:00 AM    HDL 76 07/17/2021 12:00 AM     Lab Results   Component Value Date/Time    VITD25 38 07/17/2021 12:00 AM        ASSESSMENT & RECOMMENDATIONS   Fabien Castaneda, a 47 y.o.-old female seen in for the following issues     Diabetes Mellitus Type 1   Under good control, A1c 6.9% ->6.3%  BS elevated at meals  I have reviewed both pump anf CGM download, will adjust pump settings to:   Basal Insulin:12a 0.625, 5:30a 0.625, 12p 0.750, 10p 0.70, CR 12a 12, 530 am 10, 12p 9, 12p 10, ISF 75, Target: 110, Active Insulin: 3:30 hours  Patient not up to date with the routine diabetes maintenance and prevention   Will order annual diabetes labs   A1c at next OV    Continuous Glucose Monitoring (CGM) download and interpretation   I personally reviewed and interpreted continuous glucose monitor (CGM) download. CGM report was discussed with patient including blood glucose patterns, percentages of blood glucose at goal, above goal and below goal. Insulin dosages/antidiabetic regimen was adjusted according to CGM download. Full CGM was scanned under media. Hyperlipidemia  Continue Zocor    Last lipid levels at goal     Low vitamin D  Continue Vit D supplement  Last Vit D 47 on 5/7/22    Obesity  Discussed lifestyle changes including diet and exercise with patient in depth.  Also, discussed with patient cardiovascular risk associated with obesity     I personally reviewed external notes from PCP and other patient's care team providers, and personally interpreted labs associated with the above diagnosis. I also ordered labs to further assess and manage the above addressed medical conditions    Return in about 4 months (around 6/15/2023) for Type 2 DM . The above issues were reviewed with the patient who understood and agreed with the plan  Thank you for allowing us to participate in the care of this patient. Please do not hesitate to contact us with any additional questions. Diagnosis Orders   1. Type 1 diabetes mellitus without complication (HCC)  POCT glycosylated hemoglobin (Hb A1C)    CBC    Basic Metabolic Panel    MICROALBUMIN / CREATININE URINE RATIO      2. Insulin pump in place        3. Hyperlipidemia, unspecified hyperlipidemia type  LIPID PANEL      4. Vitamin D deficiency  Vitamin D 25 Hydroxy      5. Class 3 severe obesity due to excess calories without serious comorbidity with body mass index (BMI) of 40.0 to 44.9 in adult [facility])            Enrico Aguila, [address] [address]   Phone: [phone]  Fax: [phone]  --------------------------------------------  An electronic signature was used to authenticate this note.  TETO Bennett NP on 2/15/2023 at 12:53 PM

## 2023-06-05 DIAGNOSIS — E10.9 TYPE 1 DIABETES MELLITUS WITHOUT COMPLICATION (HCC): ICD-10-CM

## 2023-06-05 RX ORDER — INSULIN LISPRO 100 [IU]/ML
INJECTION, SOLUTION INTRAVENOUS; SUBCUTANEOUS
Qty: 30 ML | Refills: 3 | Status: SHIPPED | OUTPATIENT
Start: 2023-06-05

## 2023-10-19 DIAGNOSIS — E10.9 TYPE 1 DIABETES MELLITUS WITHOUT COMPLICATION (HCC): ICD-10-CM

## 2023-10-27 RX ORDER — INSULIN LISPRO 100 [IU]/ML
INJECTION, SOLUTION INTRAVENOUS; SUBCUTANEOUS
Qty: 30 ML | Refills: 3 | Status: SHIPPED | OUTPATIENT
Start: 2023-10-27

## 2023-11-13 ENCOUNTER — OFFICE VISIT (OUTPATIENT)
Dept: ENDOCRINOLOGY | Age: 53
End: 2023-11-13
Payer: COMMERCIAL

## 2023-11-13 VITALS
BODY MASS INDEX: 53.92 KG/M2 | DIASTOLIC BLOOD PRESSURE: 78 MMHG | HEIGHT: 62 IN | WEIGHT: 293 LBS | HEART RATE: 62 BPM | RESPIRATION RATE: 18 BRPM | SYSTOLIC BLOOD PRESSURE: 148 MMHG | OXYGEN SATURATION: 100 %

## 2023-11-13 DIAGNOSIS — Z96.41 INSULIN PUMP IN PLACE: ICD-10-CM

## 2023-11-13 DIAGNOSIS — E55.9 VITAMIN D DEFICIENCY: ICD-10-CM

## 2023-11-13 DIAGNOSIS — E10.9 TYPE 1 DIABETES MELLITUS WITHOUT COMPLICATION (HCC): Primary | ICD-10-CM

## 2023-11-13 DIAGNOSIS — E78.5 HYPERLIPIDEMIA, UNSPECIFIED HYPERLIPIDEMIA TYPE: ICD-10-CM

## 2023-11-13 LAB — HBA1C MFR BLD: 6.3 %

## 2023-11-13 PROCEDURE — 83036 HEMOGLOBIN GLYCOSYLATED A1C: CPT | Performed by: FAMILY MEDICINE

## 2023-11-13 PROCEDURE — 3077F SYST BP >= 140 MM HG: CPT | Performed by: FAMILY MEDICINE

## 2023-11-13 PROCEDURE — 3044F HG A1C LEVEL LT 7.0%: CPT | Performed by: FAMILY MEDICINE

## 2023-11-13 PROCEDURE — 99214 OFFICE O/P EST MOD 30 MIN: CPT | Performed by: FAMILY MEDICINE

## 2023-11-13 PROCEDURE — 95251 CONT GLUC MNTR ANALYSIS I&R: CPT | Performed by: FAMILY MEDICINE

## 2023-11-13 PROCEDURE — 3078F DIAST BP <80 MM HG: CPT | Performed by: FAMILY MEDICINE

## 2023-11-13 NOTE — PROGRESS NOTES
07/17/2021 12:00 AM    K 4.1 07/17/2021 12:00 AM    CO2 28.0 07/17/2021 12:00 AM    CALCIUM 8.7 07/17/2021 12:00 AM      No results found for: \"TSH\", \"T4FREE\", \"Z6BXVKR\", \"FT3\", \"W4QFFWL\", \"TSI\", \"TPOABS\", \"THGAB\"  Lab Results   Component Value Date/Time    LABA1C 6.3 11/13/2023 04:14 PM    GLUCOSE 174 07/17/2021 12:00 AM     Lab Results   Component Value Date/Time    CHOL 168 07/17/2021 12:00 AM    TRIG 59 07/17/2021 12:00 AM    HDL 76 07/17/2021 12:00 AM     Lab Results   Component Value Date/Time    VITD25 38 07/17/2021 12:00 AM        ASSESSMENT & RECOMMENDATIONS   Annette Montero, a 46 y.o.-old female seen in for the following issues     Diabetes Mellitus Type 1   Under good control, Hgb A1C 6.3%  Blood sugar not coming down after meals. Patient then begins correcting and the insulin stacking is leading to hypoglycemia. Instructed to wait until 2 hours after a meal before she corrects. I have reviewed both pump and CGM download, will adjust pump settings to:   Basal Insulin:12a 0.625, 5:30a 0.625, 12p 0.750, 10p 0.70, CR 12a 12, 530 am 9, 12p 8 10p 9, ISF 70, Target: 110, Active Insulin: 3:30 hours  Patient not up to date with the routine diabetes maintenance and prevention   [facility] labs reviewed and at goal  A1c at next OV    Continuous Glucose Monitoring (CGM) download and interpretation   I personally reviewed and interpreted continuous glucose monitor (CGM) download. CGM report was discussed with patient including blood glucose patterns, percentages of blood glucose at goal, above goal and below goal. Insulin dosages/antidiabetic regimen was adjusted according to CGM download. Full CGM was scanned under media. Hyperlipidemia  Continue Zocor    Advised low saturated fat diet and exercise  Advised optimal glucose control       Obesity  Discussed lifestyle changes including diet and exercise with patient in depth.  Also, discussed with patient cardiovascular risk associated with obesity     I

## 2024-02-14 ENCOUNTER — OFFICE VISIT (OUTPATIENT)
Dept: ENDOCRINOLOGY | Age: 54
End: 2024-02-14
Payer: COMMERCIAL

## 2024-02-14 VITALS
OXYGEN SATURATION: 98 % | BODY MASS INDEX: 46.19 KG/M2 | HEART RATE: 75 BPM | DIASTOLIC BLOOD PRESSURE: 89 MMHG | SYSTOLIC BLOOD PRESSURE: 147 MMHG | HEIGHT: 62 IN | WEIGHT: 251 LBS

## 2024-02-14 DIAGNOSIS — Z96.41 INSULIN PUMP IN PLACE: ICD-10-CM

## 2024-02-14 DIAGNOSIS — E10.9 TYPE 1 DIABETES MELLITUS WITHOUT COMPLICATION (HCC): Primary | ICD-10-CM

## 2024-02-14 PROCEDURE — 99214 OFFICE O/P EST MOD 30 MIN: CPT | Performed by: FAMILY MEDICINE

## 2024-02-14 PROCEDURE — 95251 CONT GLUC MNTR ANALYSIS I&R: CPT | Performed by: FAMILY MEDICINE

## 2024-02-14 PROCEDURE — 3079F DIAST BP 80-89 MM HG: CPT | Performed by: FAMILY MEDICINE

## 2024-02-14 PROCEDURE — 3077F SYST BP >= 140 MM HG: CPT | Performed by: FAMILY MEDICINE

## 2024-02-14 NOTE — PROGRESS NOTES
[facility] Department of Endocrinology Diabetes and Metabolism   [address] [place] [address]. [address] [place] [address]    Phone: [phone]  Fax: [phone]    Date of Service: 2/14/24  Primary Care Physician: Shreya Romo DO  Provider: TETO Fabian - CNP      Reason for the visit:  Type I DM on insulin Pump    History of Present Illness:  The history is provided by the patient. No  was used. Accuracy of the patient data is excellent.  Kirstie Auguste is a very pleasant 53 y.o. female seen today for follow up visit     Kirstie Auguste was diagnosed with diabetes in 2001 and has been on insulin Pump for many years     Currently on T-slim pump + DEXCOM CGM     Current settings: 12a 0.625, 5:30a 0.625, 12p 0.750, 10p 0.70, CR 12a 12, 530 am 9, 12p 8 10p 9, ISF 70, Target: 110, Active Insulin: 3:30 hours    TIR 66%  Hyperglycemia 32%  Hypoglycemia 2%  Avg glucose 163      TDD 45.28 units    Blood sugars are not coming down when she puts in her carbs, therefore she starts performing corrections on her pump which can lead to hypoglycemia    Lab Results   Component Value Date/Time    LABA1C 6.3 11/13/2023 04:14 PM    LABA1C 6.4 06/15/2023 12:36 PM    LABA1C 6.3 02/15/2023 12:31 PM    LABA1C 6.9 09/08/2022 08:37 AM     Hypoglycemia due to insulin stacking  Activity level: always busy with Family and work  Lifestyle modification: Diet, Is mindful of a diabetic diet  Patient is due for eye exam  Podiatry Exam:self care   Microvascular complications: no nephropathy, retinopathy, or neuropathy  Macrovascular complications: no CAD, PVD, or CVA      PAST MEDICAL HISTORY   Past Medical History:   Diagnosis Date    Depression     DM type 1 (diabetes mellitus, type 1) (HCC)     On insulin Pump     Fatigue     Hyperlipidemia     Insulin pump in place     Obesity due to excess calories without serious comorbidity     Vitamin D deficiency      PAST SURGICAL HISTORY   History

## 2024-03-16 DIAGNOSIS — E10.9 TYPE 1 DIABETES MELLITUS WITHOUT COMPLICATION (HCC): ICD-10-CM

## 2024-03-18 RX ORDER — INSULIN LISPRO 100 [IU]/ML
INJECTION, SOLUTION INTRAVENOUS; SUBCUTANEOUS
Qty: 30 ML | Refills: 3 | Status: SHIPPED | OUTPATIENT
Start: 2024-03-18

## 2024-05-15 ENCOUNTER — OFFICE VISIT (OUTPATIENT)
Dept: ENDOCRINOLOGY | Age: 54
End: 2024-05-15
Payer: COMMERCIAL

## 2024-05-15 VITALS
DIASTOLIC BLOOD PRESSURE: 77 MMHG | OXYGEN SATURATION: 93 % | BODY MASS INDEX: 45.08 KG/M2 | HEIGHT: 62 IN | RESPIRATION RATE: 18 BRPM | WEIGHT: 245 LBS | HEART RATE: 81 BPM | SYSTOLIC BLOOD PRESSURE: 116 MMHG

## 2024-05-15 DIAGNOSIS — E10.9 TYPE 1 DIABETES MELLITUS WITHOUT COMPLICATION (HCC): Primary | ICD-10-CM

## 2024-05-15 DIAGNOSIS — E66.01 CLASS 3 SEVERE OBESITY DUE TO EXCESS CALORIES WITHOUT SERIOUS COMORBIDITY WITH BODY MASS INDEX (BMI) OF 40.0 TO 44.9 IN ADULT (HCC): ICD-10-CM

## 2024-05-15 DIAGNOSIS — E55.9 VITAMIN D DEFICIENCY: ICD-10-CM

## 2024-05-15 DIAGNOSIS — E78.5 HYPERLIPIDEMIA, UNSPECIFIED HYPERLIPIDEMIA TYPE: ICD-10-CM

## 2024-05-15 LAB — HBA1C MFR BLD: 6.1 %

## 2024-05-15 PROCEDURE — 3044F HG A1C LEVEL LT 7.0%: CPT | Performed by: FAMILY MEDICINE

## 2024-05-15 PROCEDURE — 3078F DIAST BP <80 MM HG: CPT | Performed by: FAMILY MEDICINE

## 2024-05-15 PROCEDURE — 99214 OFFICE O/P EST MOD 30 MIN: CPT | Performed by: FAMILY MEDICINE

## 2024-05-15 PROCEDURE — 3074F SYST BP LT 130 MM HG: CPT | Performed by: FAMILY MEDICINE

## 2024-05-15 PROCEDURE — 83036 HEMOGLOBIN GLYCOSYLATED A1C: CPT | Performed by: FAMILY MEDICINE

## 2024-05-15 NOTE — PROGRESS NOTES
[facility] [employer] [facility] Department of Endocrinology Diabetes and Metabolism   [address] [facility] [place] [address]    Phone: [phone]  Fax: [phone]    Date of Service: 5/15/24  Primary Care Physician: Shreya Romo DO  Provider: TETO Fabian - CNP      Reason for the visit:  Type I DM on insulin Pump    History of Present Illness:  The history is provided by the patient. No  was used. Accuracy of the patient data is excellent.  Kirstie Auguste is a very pleasant 54 y.o. female seen today for follow up visit     Kirstie Auguste was diagnosed with diabetes in 2001 and has been on insulin Pump for many years     Currently on T-slim pump + DEXCOM CGM     Basal Insulin:12a 0.7, 5:30a 0.7, 12p 0.750, 10p 0.70, CR 12a 12, 530 am 8, 12p 7, 10p 8, ISF 55, Target: 110, Active Insulin: 3:30 hours    Unable to download pump in office today.        Lab Results   Component Value Date/Time    LABA1C 6.3 11/13/2023 04:14 PM    LABA1C 6.4 06/15/2023 12:36 PM    LABA1C 6.3 02/15/2023 12:31 PM    LABA1C 6.9 09/08/2022 08:37 AM     Hypoglycemia improved  Activity level: always busy with Family and work  Lifestyle modification: Diet, Is mindful of a diabetic diet  Patient is due for eye exam  Podiatry Exam:self care   Microvascular complications: no nephropathy, retinopathy, or neuropathy  Macrovascular complications: no CAD, PVD, or CVA      PAST MEDICAL HISTORY   Past Medical History:   Diagnosis Date    Depression     DM type 1 (diabetes mellitus, type 1) ([facility])     On insulin Pump     Fatigue     Hyperlipidemia     Insulin pump in place     Obesity due to excess calories without serious comorbidity     Vitamin D deficiency      PAST SURGICAL HISTORY   No past surgical history on file.  SOCIAL HISTORY   Tobacco:   reports that she has never smoked. She has never used smokeless tobacco.  Alcol:   reports current alcohol use.  Illicit Drugs:   reports no history of drug

## 2024-08-29 ENCOUNTER — OFFICE VISIT (OUTPATIENT)
Dept: ENDOCRINOLOGY | Age: 54
End: 2024-08-29

## 2024-08-29 VITALS
HEIGHT: 62 IN | BODY MASS INDEX: 43.69 KG/M2 | OXYGEN SATURATION: 100 % | RESPIRATION RATE: 18 BRPM | SYSTOLIC BLOOD PRESSURE: 109 MMHG | WEIGHT: 237.4 LBS | HEART RATE: 78 BPM | DIASTOLIC BLOOD PRESSURE: 73 MMHG

## 2024-08-29 DIAGNOSIS — E78.5 HYPERLIPIDEMIA, UNSPECIFIED HYPERLIPIDEMIA TYPE: ICD-10-CM

## 2024-08-29 DIAGNOSIS — E66.01 CLASS 3 SEVERE OBESITY DUE TO EXCESS CALORIES WITHOUT SERIOUS COMORBIDITY WITH BODY MASS INDEX (BMI) OF 40.0 TO 44.9 IN ADULT (HCC): ICD-10-CM

## 2024-08-29 DIAGNOSIS — E10.9 TYPE 1 DIABETES MELLITUS WITHOUT COMPLICATION (HCC): Primary | ICD-10-CM

## 2024-08-29 RX ORDER — INSULIN LISPRO 100 [IU]/ML
INJECTION, SOLUTION INTRAVENOUS; SUBCUTANEOUS
Qty: 30 ML | Refills: 11 | Status: SHIPPED | OUTPATIENT
Start: 2024-08-29

## 2024-08-29 RX ORDER — ROSUVASTATIN CALCIUM 5 MG/1
5 TABLET, COATED ORAL DAILY
COMMUNITY

## 2024-08-29 NOTE — PROGRESS NOTES
[facility] Department of Endocrinology Diabetes and Metabolism   [address] [place] [address]    Phone: [phone]  Fax: [phone]    Date of Service: 8/29/24  Primary Care Physician: Shreya Romo DO  Provider: TETO Fabian - CNP      Reason for the visit:  Type I DM on insulin Pump    History of Present Illness:  The history is provided by the patient. No  was used. Accuracy of the patient data is excellent.  Kirstie Auguste is a very pleasant 54 y.o. female seen today for follow up visit     Kirstie Auguste was diagnosed with diabetes in 2001 and has been on insulin Pump for many years     Currently on T-slim pump + DEXCOM CGM     Current settings: 12a 0.7, 5:30a 0.7, 12p 0.750, 10p 0.70, CR 12a 12, 530 am 8, 12p 7, 10p 8, ISF 55, Target: 110, Active Insulin: 3:30 hours    TIR 77%  Hyperglycemia 20.3%  Hypoglycemia 2.2%  Avg glucose 146      TDD 37.74 units      Hemoglobin A1c on 8/28/2024 6.1% at [facility]  Lab Results   Component Value Date/Time    LABA1C 6.1 05/15/2024 03:00 PM    LABA1C 6.3 11/13/2023 04:14 PM    LABA1C 6.4 06/15/2023 12:36 PM    LABA1C 6.3 02/15/2023 12:31 PM     Hypoglycemia due to insulin stacking  Activity level: always busy with Family and work  Lifestyle modification: Diet, Is mindful of a diabetic diet  Patient is due for eye exam  Podiatry Exam:self care   Microvascular complications: no nephropathy, retinopathy, or neuropathy  Macrovascular complications: no CAD, PVD, or CVA      PAST MEDICAL HISTORY   Past Medical History:   Diagnosis Date    Depression     DM type 1 (diabetes mellitus, type 1) (HCC)     On insulin Pump     Fatigue     Hyperlipidemia     Insulin pump in place     Obesity due to excess calories without serious comorbidity     Vitamin D deficiency      PAST SURGICAL HISTORY   No past surgical history on file.  SOCIAL HISTORY   Tobacco:   reports that she has never smoked. She has never used  diaphoresis, no generalized weakness.  HEENT: No blurred vision, No sore throat, no ear pain, no hair loss  Neck: denied any neck swelling, difficulty swallowing,   Cardio-pulmonary: No CP, SOB or palpitation, No orthopnea or PND. No cough or wheezing.  GI: No N/V/D, no constipation, No abdominal pain, no melena or hematochezia   : Denied any dysuria, hematuria, flank pain, discharge, or incontinence.   Skin: denied any rash, ulcer, Hirsute, or hyperpigmentation.   MSK: denied any joint deformity, joint pain/swelling, muscle pain, or back pain.  Neuro: no numbness, no tingling, no weakness    OBJECTIVE    /73   Pulse 78   Resp 18   Ht 1.575 m (5' 2\")   Wt 107.7 kg (237 lb 6.4 oz)   LMP 12/20/2018   SpO2 100%   BMI 43.42 kg/m²   BP Readings from Last 4 Encounters:   08/29/24 109/73   05/15/24 116/77   02/14/24 (!) 147/89   11/13/23 (!) 148/78     Wt Readings from Last 6 Encounters:   08/29/24 107.7 kg (237 lb 6.4 oz)   05/15/24 111.1 kg (245 lb)   02/14/24 113.9 kg (251 lb)   11/13/23 (!) 273 kg (601 lb 13.7 oz)   06/15/23 109.3 kg (241 lb)   02/15/23 104.8 kg (231 lb)     Physical examination:  General: awake alert, oriented x3, no abnormal position or movements.  HEENT: normocephalic non-traumatic, no exophthalmos   Neck: supple, no LN enlargement, no thyromegaly, no thyroid tenderness, no JVD.  Pulm: Clear equal air entry no added sounds, no wheezing or rhonchi    CVS: S1 + S2, no murmur, no heave. Dorsalis pedis pulse palpable   Abd: soft lax, no tenderness, no organomegaly, audible bowel sounds.   Skin: warm, no lesions, no rash. No callus, no Ulcers, No acanthosis nigricans.   Neuro: CN intact, sensation normal bilateral , muscle power normal  Psych: normal mood, and affect    Review of Laboratory Data:    No results found for: \"WBC\", \"RBC\", \"HGB\", \"HCT\", \"MCV\", \"MCH\", \"MCHC\", \"RDW\", \"PLT\", \"MPV\", \"GRANULOCYTES\", \"NEUT\", \"BANDS\"   Lab Results   Component Value Date/Time     07/17/2021 12:00

## 2025-01-02 ENCOUNTER — OFFICE VISIT (OUTPATIENT)
Dept: ENDOCRINOLOGY | Age: 55
End: 2025-01-02
Payer: COMMERCIAL

## 2025-01-02 VITALS
BODY MASS INDEX: 43.94 KG/M2 | DIASTOLIC BLOOD PRESSURE: 73 MMHG | SYSTOLIC BLOOD PRESSURE: 117 MMHG | TEMPERATURE: 98.6 F | OXYGEN SATURATION: 96 % | RESPIRATION RATE: 18 BRPM | HEIGHT: 62 IN | WEIGHT: 238.8 LBS | HEART RATE: 70 BPM

## 2025-01-02 DIAGNOSIS — E78.5 HYPERLIPIDEMIA, UNSPECIFIED HYPERLIPIDEMIA TYPE: ICD-10-CM

## 2025-01-02 DIAGNOSIS — E10.9 TYPE 1 DIABETES MELLITUS WITHOUT COMPLICATION (HCC): Primary | ICD-10-CM

## 2025-01-02 DIAGNOSIS — E66.01 CLASS 3 SEVERE OBESITY DUE TO EXCESS CALORIES WITHOUT SERIOUS COMORBIDITY WITH BODY MASS INDEX (BMI) OF 40.0 TO 44.9 IN ADULT: ICD-10-CM

## 2025-01-02 DIAGNOSIS — E66.813 CLASS 3 SEVERE OBESITY DUE TO EXCESS CALORIES WITHOUT SERIOUS COMORBIDITY WITH BODY MASS INDEX (BMI) OF 40.0 TO 44.9 IN ADULT: ICD-10-CM

## 2025-01-02 LAB — HBA1C MFR BLD: 7.1 %

## 2025-01-02 PROCEDURE — 83036 HEMOGLOBIN GLYCOSYLATED A1C: CPT | Performed by: FAMILY MEDICINE

## 2025-01-02 PROCEDURE — 3051F HG A1C>EQUAL 7.0%<8.0%: CPT | Performed by: FAMILY MEDICINE

## 2025-01-02 PROCEDURE — 95251 CONT GLUC MNTR ANALYSIS I&R: CPT | Performed by: FAMILY MEDICINE

## 2025-01-02 PROCEDURE — 3074F SYST BP LT 130 MM HG: CPT | Performed by: FAMILY MEDICINE

## 2025-01-02 PROCEDURE — 99214 OFFICE O/P EST MOD 30 MIN: CPT | Performed by: FAMILY MEDICINE

## 2025-01-02 PROCEDURE — 3078F DIAST BP <80 MM HG: CPT | Performed by: FAMILY MEDICINE

## 2025-01-02 RX ORDER — ACYCLOVIR 400 MG/1
TABLET ORAL
Qty: 9 EACH | Refills: 3 | Status: SHIPPED | OUTPATIENT
Start: 2025-01-02

## 2025-01-02 NOTE — PROGRESS NOTES
[facility] Department of Endocrinology Diabetes and Metabolism   [address] [place] [address]    Phone: [phone]  Fax: [phone]    Date of Service: 1/2/25  Primary Care Physician: Shreya Romo DO  Provider: TETO Fabian - CNP      Reason for the visit:  Type I DM on insulin Pump    History of Present Illness:  The history is provided by the patient. No  was used. Accuracy of the patient data is excellent.  Kirstie Auguste is a very pleasant 54 y.o. female seen today for follow up visit     Kirstie Auguste was diagnosed with diabetes in 2001 and has been on insulin Pump for many years     Currently on T-slim pump + DEXCOM CGM     Current settings: 12a 0.7, 5:30a 0.7, 12p 0.750, 10p 0.70, CR 12a 12, 530 am 8, 12p 7, 10p 8, ISF 55, Target: 110, Active Insulin: 3:30 hours    Unfortunately, patient had some insurance issues with [employer] and was unable to get her sensor for 3 weeks.  She was in manual mode for 3 weeks which is reflected in the following CGM review:    TIR 33%  Hyperglycemia 62%  Hypoglycemia 5%  Avg glucose 217    TDD 43.01      Patient is now back in control IQ for the past 48 hours and states blood sugars have dramatically improved.  She is requesting that no pump changes be made today until she can stay in control IQ for while longer.    Hemoglobin A1c on 8/28/2024 6.1% at [facility]  Lab Results   Component Value Date/Time    LABA1C 7.1 01/02/2025 04:05 PM    LABA1C 6.1 05/15/2024 03:00 PM    LABA1C 6.3 11/13/2023 04:14 PM    LABA1C 6.4 06/15/2023 12:36 PM     No hypoglycemia  Activity level: always busy with Family and work  Lifestyle modification: Diet, Is mindful of a diabetic diet  Patient is due for eye exam  Podiatry Exam:self care   Microvascular complications: no nephropathy, retinopathy, or neuropathy  Macrovascular complications: no CAD, PVD, or CVA      PAST MEDICAL HISTORY   Past Medical History:   Diagnosis Date

## 2025-01-28 DIAGNOSIS — E10.9 TYPE 1 DIABETES MELLITUS WITHOUT COMPLICATION (HCC): ICD-10-CM

## 2025-01-28 DIAGNOSIS — Z96.41 INSULIN PUMP IN PLACE: ICD-10-CM

## 2025-01-28 RX ORDER — PROCHLORPERAZINE 25 MG/1
SUPPOSITORY RECTAL
Qty: 1 EACH | Refills: 3 | Status: SHIPPED | OUTPATIENT
Start: 2025-01-28

## 2025-01-28 RX ORDER — PROCHLORPERAZINE 25 MG/1
SUPPOSITORY RECTAL
Qty: 9 EACH | Refills: 3 | Status: SHIPPED | OUTPATIENT
Start: 2025-01-28

## 2025-04-29 ENCOUNTER — OFFICE VISIT (OUTPATIENT)
Dept: ENDOCRINOLOGY | Age: 55
End: 2025-04-29
Payer: COMMERCIAL

## 2025-04-29 VITALS
SYSTOLIC BLOOD PRESSURE: 155 MMHG | BODY MASS INDEX: 43.52 KG/M2 | OXYGEN SATURATION: 100 % | DIASTOLIC BLOOD PRESSURE: 89 MMHG | WEIGHT: 236.5 LBS | HEIGHT: 62 IN | TEMPERATURE: 99.1 F | HEART RATE: 67 BPM

## 2025-04-29 DIAGNOSIS — E10.9 TYPE 1 DIABETES MELLITUS WITHOUT COMPLICATION (HCC): Primary | ICD-10-CM

## 2025-04-29 DIAGNOSIS — E78.5 HYPERLIPIDEMIA, UNSPECIFIED HYPERLIPIDEMIA TYPE: ICD-10-CM

## 2025-04-29 DIAGNOSIS — E66.813 CLASS 3 SEVERE OBESITY DUE TO EXCESS CALORIES WITHOUT SERIOUS COMORBIDITY WITH BODY MASS INDEX (BMI) OF 40.0 TO 44.9 IN ADULT (HCC): ICD-10-CM

## 2025-04-29 PROCEDURE — 3079F DIAST BP 80-89 MM HG: CPT | Performed by: FAMILY MEDICINE

## 2025-04-29 PROCEDURE — 3077F SYST BP >= 140 MM HG: CPT | Performed by: FAMILY MEDICINE

## 2025-04-29 PROCEDURE — 95251 CONT GLUC MNTR ANALYSIS I&R: CPT | Performed by: FAMILY MEDICINE

## 2025-04-29 PROCEDURE — 3051F HG A1C>EQUAL 7.0%<8.0%: CPT | Performed by: FAMILY MEDICINE

## 2025-04-29 PROCEDURE — 99214 OFFICE O/P EST MOD 30 MIN: CPT | Performed by: FAMILY MEDICINE

## 2025-04-29 NOTE — PROGRESS NOTES
[facility] [employer] [facility] Department of Endocrinology Diabetes and Metabolism   [address] [place] [address]    Phone: [phone]  Fax: [phone]    Date of Service: 4/29/25  Primary Care Physician: Shreya Romo DO  Provider: TETO Fabian - CNP      Reason for the visit:  Type I DM on insulin Pump    History of Present Illness:  The history is provided by the patient. No  was used. Accuracy of the patient data is excellent.  Kirstie Auguste is a very pleasant 55 y.o. female seen today for follow up visit     Kirstie Auguste was diagnosed with diabetes in 2001 and has been on insulin Pump for many years     Currently on T-slim pump + DEXCOM CGM     Current settings: Basal Insulin:12a 0.7, 5:30a 0.7, 12p 0.750, 10p 0.70, CR 12a 12, 530 am 8, 12p 7, 10p 8, ISF 55, Target: 110, Active Insulin: 3:30 hours    TIR 72%  Hyperglycemia 24.3%  Hypoglycemia 3.4%  Avg glucose 147    TDD 43.99    Patient is now using Dexcom G7 cup, but much prefers G6.  When her current supply of G7 is exhausted she may ask [employer] to provide her with Dexcom G6 again.  Patient will call if she needs prescription    Hemoglobin A1c on 8/28/2024 6.1% at [facility]  Lab Results   Component Value Date/Time    LABA1C 7.1 01/02/2025 04:05 PM    LABA1C 6.1 05/15/2024 03:00 PM    LABA1C 6.3 11/13/2023 04:14 PM    LABA1C 6.4 06/15/2023 12:36 PM     Infrequent hypoglycemia  Lifestyle modification: Diet, Is mindful of a diabetic diet  Up to date with eye exams  Podiatry Exam:self care   Microvascular complications: no nephropathy, retinopathy, or neuropathy  Macrovascular complications: no CAD, PVD, or CVA      PAST MEDICAL HISTORY   Past Medical History:   Diagnosis Date    Depression     DM type 1 (diabetes mellitus, type 1) (HCC)     On insulin Pump     Fatigue     Hyperlipidemia     Insulin pump in place     Obesity due to excess calories without serious comorbidity     Vitamin D deficiency

## 2025-07-02 ENCOUNTER — OFFICE VISIT (OUTPATIENT)
Dept: ENDOCRINOLOGY | Age: 55
End: 2025-07-02
Payer: COMMERCIAL

## 2025-07-02 VITALS
HEART RATE: 78 BPM | WEIGHT: 221 LBS | RESPIRATION RATE: 20 BRPM | SYSTOLIC BLOOD PRESSURE: 122 MMHG | DIASTOLIC BLOOD PRESSURE: 83 MMHG | HEIGHT: 62 IN | OXYGEN SATURATION: 98 % | TEMPERATURE: 98.9 F | BODY MASS INDEX: 40.67 KG/M2

## 2025-07-02 DIAGNOSIS — E66.813 CLASS 3 SEVERE OBESITY DUE TO EXCESS CALORIES WITHOUT SERIOUS COMORBIDITY WITH BODY MASS INDEX (BMI) OF 40.0 TO 44.9 IN ADULT (HCC): ICD-10-CM

## 2025-07-02 DIAGNOSIS — E78.5 HYPERLIPIDEMIA, UNSPECIFIED HYPERLIPIDEMIA TYPE: ICD-10-CM

## 2025-07-02 DIAGNOSIS — E10.9 TYPE 1 DIABETES MELLITUS WITHOUT COMPLICATION (HCC): Primary | ICD-10-CM

## 2025-07-02 PROCEDURE — 3079F DIAST BP 80-89 MM HG: CPT | Performed by: FAMILY MEDICINE

## 2025-07-02 PROCEDURE — 95251 CONT GLUC MNTR ANALYSIS I&R: CPT | Performed by: FAMILY MEDICINE

## 2025-07-02 PROCEDURE — 3074F SYST BP LT 130 MM HG: CPT | Performed by: FAMILY MEDICINE

## 2025-07-02 PROCEDURE — 3051F HG A1C>EQUAL 7.0%<8.0%: CPT | Performed by: FAMILY MEDICINE

## 2025-07-02 PROCEDURE — 99214 OFFICE O/P EST MOD 30 MIN: CPT | Performed by: FAMILY MEDICINE

## 2025-07-02 RX ORDER — INSULIN LISPRO 100 [IU]/ML
INJECTION, SOLUTION INTRAVENOUS; SUBCUTANEOUS
Qty: 30 ML | Refills: 11 | Status: SHIPPED | OUTPATIENT
Start: 2025-07-02

## 2025-07-02 RX ORDER — BLOOD-GLUCOSE SENSOR
EACH MISCELLANEOUS
Qty: 2 EACH | Refills: 11 | Status: SHIPPED | OUTPATIENT
Start: 2025-07-02

## 2025-07-02 NOTE — PROGRESS NOTES
[facility] Department of Endocrinology Diabetes and Metabolism   [address] [place] [address]    Phone: [phone]  Fax: [phone]    Date of Service: 7/2/25  Primary Care Physician: Shreya Romo DO  Provider: TETO Fabian - CNP      Reason for the visit:  Type I DM on insulin Pump    History of Present Illness:  The history is provided by the patient. No  was used. Accuracy of the patient data is excellent.  Kirstie Auguste is a very pleasant 55 y.o. female seen today for follow up visit     Kirstie Auguste was diagnosed with diabetes in 2001 and has been on insulin Pump for many years     Currently on T-slim pump + DEXCOM CGM     Current settings: Basal Insulin:12a 0.7, 5:30a 0.7, 12p 0.750, 10p 0.70, CR 12a 12, 530 am 8, 12p 7, 10p 7, ISF 38, Target: 110, Active Insulin: 5 hours    Unfortunately, she owes $800 for Dexcom's and has been unable to get them filled.     She is entering her blood sugars into her pump, though admits that she might not be checking as often as she should be as it is difficult to fingerstick so many times per day.    Overall, running higher without her CGM    Hemoglobin A1c on 8/28/2024 6.1% at [facility]  Lab Results   Component Value Date/Time    LABA1C 7.1 01/02/2025 04:05 PM    LABA1C 6.1 05/15/2024 03:00 PM    LABA1C 6.3 11/13/2023 04:14 PM    LABA1C 6.4 06/15/2023 12:36 PM     Infrequent hypoglycemia  Lifestyle modification: Diet, Is mindful of a diabetic diet  Up to date with eye exams  Podiatry Exam:self care   Microvascular complications: no nephropathy, retinopathy, or neuropathy  Macrovascular complications: no CAD, PVD, or CVA      PAST MEDICAL HISTORY   Past Medical History:   Diagnosis Date    Depression     DM type 1 (diabetes mellitus, type 1) (HCC)     On insulin Pump     Fatigue     Hyperlipidemia     Insulin pump in place     Obesity due to excess calories without serious comorbidity     Vitamin D